# Patient Record
Sex: MALE | Race: BLACK OR AFRICAN AMERICAN | NOT HISPANIC OR LATINO | Employment: FULL TIME | URBAN - METROPOLITAN AREA
[De-identification: names, ages, dates, MRNs, and addresses within clinical notes are randomized per-mention and may not be internally consistent; named-entity substitution may affect disease eponyms.]

---

## 2017-03-28 ENCOUNTER — TELEPHONE (OUTPATIENT)
Dept: FAMILY MEDICINE | Facility: CLINIC | Age: 57
End: 2017-03-28

## 2017-03-28 DIAGNOSIS — Z12.11 COLON CANCER SCREENING: ICD-10-CM

## 2017-03-28 DIAGNOSIS — Z86.010 HISTORY OF COLON POLYPS: Primary | ICD-10-CM

## 2017-03-28 NOTE — TELEPHONE ENCOUNTER
Last colon 6/12/12-patient instructed to go to gi dept at [facility] to schedule and  prep. Need screening colonoscopy orders.

## 2017-03-28 NOTE — TELEPHONE ENCOUNTER
----- Message from Gama Cavazos sent at 3/28/2017  9:39 AM CDT -----  Contact: Patient - [phone]  Called regarding request for Colonoscopy Screening - He wanted to talk about his last colonoscopy and needed to schedule a new one - Please reach him at [phone] to discuss

## 2017-04-06 ENCOUNTER — TELEPHONE (OUTPATIENT)
Dept: GASTROENTEROLOGY | Facility: CLINIC | Age: 57
End: 2017-04-06

## 2017-04-06 ENCOUNTER — TELEPHONE (OUTPATIENT)
Dept: FAMILY MEDICINE | Facility: CLINIC | Age: 57
End: 2017-04-06

## 2017-04-06 DIAGNOSIS — Z80.0 FAMILY HISTORY OF COLON CANCER: ICD-10-CM

## 2017-04-06 DIAGNOSIS — G47.33 OSA (OBSTRUCTIVE SLEEP APNEA): Primary | ICD-10-CM

## 2017-04-06 DIAGNOSIS — Z86.010 PERSONAL HISTORY OF COLONIC POLYPS: Primary | ICD-10-CM

## 2017-04-06 NOTE — TELEPHONE ENCOUNTER
----- Message from Sofia Sosa sent at 4/6/2017  1:36 PM CDT -----  Contact: pt [phone]  Patient called and asked for a call back about having  a test for Sleep Apnea. Thank you.

## 2017-04-06 NOTE — TELEPHONE ENCOUNTER
Spoke to patient. States that he had a sleep study several years ago but was told he did not have enough of a problem to warrant treatment at that time.  He reports that his snoring has become much worse since that time and is requesting a new study. His wife wakes home up at night due to his snoring and has witnessed that he stops breathing. 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   Please advise.

## 2017-04-06 NOTE — TELEPHONE ENCOUNTER
----- Message from Mirna Woods sent at 4/6/2017  7:44 AM CDT -----  Contact: heather  Needs to schedule colonoscopy   Call back

## 2017-04-07 NOTE — TELEPHONE ENCOUNTER
Patient informed. Lab scheduled  For 4/8/17. States he has already received a call to schedule sleep study.

## 2017-04-08 ENCOUNTER — LAB VISIT (OUTPATIENT)
Dept: LAB | Facility: HOSPITAL | Age: 57
End: 2017-04-08
Attending: FAMILY MEDICINE
Payer: COMMERCIAL

## 2017-04-08 DIAGNOSIS — G47.33 OSA (OBSTRUCTIVE SLEEP APNEA): ICD-10-CM

## 2017-04-08 LAB — TSH SERPL DL<=0.005 MIU/L-ACNC: 0.81 UIU/ML

## 2017-04-08 PROCEDURE — 84443 ASSAY THYROID STIM HORMONE: CPT

## 2017-04-08 PROCEDURE — 36415 COLL VENOUS BLD VENIPUNCTURE: CPT | Mod: PO

## 2017-04-10 ENCOUNTER — TELEPHONE (OUTPATIENT)
Dept: FAMILY MEDICINE | Facility: CLINIC | Age: 57
End: 2017-04-10

## 2017-04-10 NOTE — TELEPHONE ENCOUNTER
Lab tsh results discussed with patient- message faxed to dept to call patient to make appt for sleep study

## 2017-04-10 NOTE — TELEPHONE ENCOUNTER
----- Message from Margo Gambino sent at 4/10/2017  7:43 AM CDT -----  Patient requesting to speak with nurse/stated has question (only information given)please call back at [phone] to advise.

## 2017-04-12 ENCOUNTER — TELEPHONE (OUTPATIENT)
Dept: FAMILY MEDICINE | Facility: CLINIC | Age: 57
End: 2017-04-12

## 2017-04-20 ENCOUNTER — TELEPHONE (OUTPATIENT)
Dept: FAMILY MEDICINE | Facility: CLINIC | Age: 57
End: 2017-04-20

## 2017-04-20 NOTE — TELEPHONE ENCOUNTER
----- Message from Melisa Joyner sent at 4/20/2017 10:16 AM CDT -----  Contact: Patient  Patient would like to know if you heard anything back about the Sleep Study. Please call patient at [phone].

## 2017-04-20 NOTE — TELEPHONE ENCOUNTER
Patient notified I spoke to Mary Jane at [employer] today- they were working on insurance verification and will call him today-patient given their phone number.

## 2017-05-05 RX ORDER — OMEPRAZOLE 40 MG/1
CAPSULE, DELAYED RELEASE ORAL
Qty: 30 CAPSULE | Refills: 5 | Status: SHIPPED | OUTPATIENT
Start: 2017-05-05 | End: 2017-10-20 | Stop reason: SDUPTHER

## 2017-05-09 ENCOUNTER — HOSPITAL ENCOUNTER (OUTPATIENT)
Facility: HOSPITAL | Age: 57
Discharge: HOME | End: 2017-05-09
Attending: SURGERY | Admitting: SURGERY
Payer: COMMERCIAL

## 2017-05-09 ENCOUNTER — SURGERY (OUTPATIENT)
Age: 57
End: 2017-05-09

## 2017-05-09 ENCOUNTER — ANESTHESIA (OUTPATIENT)
Dept: ENDOSCOPY | Facility: HOSPITAL | Age: 57
End: 2017-05-09
Payer: COMMERCIAL

## 2017-05-09 ENCOUNTER — ANESTHESIA EVENT (OUTPATIENT)
Dept: ENDOSCOPY | Facility: HOSPITAL | Age: 57
End: 2017-05-09
Payer: COMMERCIAL

## 2017-05-09 VITALS — RESPIRATION RATE: 11 BRPM

## 2017-05-09 DIAGNOSIS — Z86.010 HISTORY OF COLON POLYPS: ICD-10-CM

## 2017-05-09 PROCEDURE — D9220A PRA ANESTHESIA: Mod: CRNA,,, | Performed by: NURSE ANESTHETIST, CERTIFIED REGISTERED

## 2017-05-09 PROCEDURE — 63600175 PHARM REV CODE 636 W HCPCS: Performed by: NURSE ANESTHETIST, CERTIFIED REGISTERED

## 2017-05-09 PROCEDURE — 25000003 PHARM REV CODE 250: Performed by: NURSE ANESTHETIST, CERTIFIED REGISTERED

## 2017-05-09 PROCEDURE — G0105 COLORECTAL SCRN; HI RISK IND: HCPCS | Performed by: SURGERY

## 2017-05-09 PROCEDURE — G0105 COLORECTAL SCRN; HI RISK IND: HCPCS | Mod: ,,, | Performed by: SURGERY

## 2017-05-09 PROCEDURE — D9220A PRA ANESTHESIA: Mod: ANES,,, | Performed by: ANESTHESIOLOGY

## 2017-05-09 PROCEDURE — 37000008 HC ANESTHESIA 1ST 15 MINUTES: Performed by: SURGERY

## 2017-05-09 PROCEDURE — 25000003 PHARM REV CODE 250: Performed by: SURGERY

## 2017-05-09 PROCEDURE — 37000009 HC ANESTHESIA EA ADD 15 MINS: Performed by: SURGERY

## 2017-05-09 RX ORDER — ASPIRIN 81 MG/1
81 TABLET ORAL DAILY
COMMUNITY
End: 2018-04-23 | Stop reason: ALTCHOICE

## 2017-05-09 RX ORDER — LIDOCAINE HCL/PF 100 MG/5ML
SYRINGE (ML) INTRAVENOUS
Status: DISCONTINUED | OUTPATIENT
Start: 2017-05-09 | End: 2017-05-09

## 2017-05-09 RX ORDER — PROPOFOL 10 MG/ML
VIAL (ML) INTRAVENOUS
Status: DISCONTINUED | OUTPATIENT
Start: 2017-05-09 | End: 2017-05-09

## 2017-05-09 RX ORDER — SODIUM CHLORIDE 9 MG/ML
INJECTION, SOLUTION INTRAVENOUS CONTINUOUS
Status: DISCONTINUED | OUTPATIENT
Start: 2017-05-09 | End: 2017-05-09 | Stop reason: HOSPADM

## 2017-05-09 RX ORDER — PROPOFOL 10 MG/ML
INJECTION, EMULSION INTRAVENOUS
Status: DISCONTINUED
Start: 2017-05-09 | End: 2017-05-09 | Stop reason: HOSPADM

## 2017-05-09 RX ADMIN — SODIUM CHLORIDE 1000 ML: 0.9 INJECTION, SOLUTION INTRAVENOUS at 07:05

## 2017-05-09 RX ADMIN — LIDOCAINE HYDROCHLORIDE 50 MG: 20 INJECTION, SOLUTION INTRAVENOUS at 07:05

## 2017-05-09 RX ADMIN — PROPOFOL 100 MG: 10 INJECTION, EMULSION INTRAVENOUS at 07:05

## 2017-05-09 NOTE — ANESTHESIA POSTPROCEDURE EVALUATION
"Anesthesia Post Evaluation    Patient: Rodo Luna    Procedure(s) Performed: Procedure(s) (LRB):  COLONOSCOPY (N/A)    Final Anesthesia Type: general  Patient location during evaluation: PACU  Patient participation: Yes- Able to Participate  Level of consciousness: awake and alert  Post-procedure vital signs: reviewed and stable  Pain management: adequate  Airway patency: patent  PONV status at discharge: No PONV  Anesthetic complications: no      Cardiovascular status: hemodynamically stable  Respiratory status: unassisted and room air  Hydration status: euvolemic  Follow-up not needed.        Visit Vitals    BP (!) 169/91    Pulse 65    Temp 36.3 °C (97.3 °F)    Resp 12    Ht 6' 2" (1.88 m)    Wt 120.2 kg (265 lb)    SpO2 98%    BMI 34.02 kg/m2       Pain/Lavern Score: Pain Assessment Performed: Yes (5/9/2017  7:45 AM)  Presence of Pain: denies (5/9/2017  7:55 AM)  Pain Rating Prior to Med Admin: 8 (5/9/2017  7:08 AM)  Lavern Score: 10 (5/9/2017  8:15 AM)      "

## 2017-05-09 NOTE — TRANSFER OF CARE
"Anesthesia Transfer of Care Note    Patient: Rodo Luna    Procedure(s) Performed: Procedure(s) (LRB):  COLONOSCOPY (N/A)    Patient location: GI    Anesthesia Type: general    Transport from OR: Transported from OR on 2-3 L/min O2 by NC with adequate spontaneous ventilation    Post pain: adequate analgesia    Post assessment: no apparent anesthetic complications    Post vital signs: stable    Level of consciousness: awake, alert and oriented    Nausea/Vomiting: no nausea/vomiting    Complications: none    Transfer of care protocol was followed      Last vitals:   Visit Vitals    /78    Pulse 68    Temp 36.2 °C (97.1 °F) (Oral)    Resp 11    Ht 6' 2" (1.88 m)    Wt 120.2 kg (265 lb)    SpO2 100%    BMI 34.02 kg/m2     "

## 2017-05-09 NOTE — IP AVS SNAPSHOT
[address] [place] [address]  Phone: [phone]           Patient Discharge Instructions   Our goal is to set you up for success. This packet includes information on your condition, medications, and your home care.  It will help you care for yourself to prevent having to return to the hospital.     Please ask your nurse if you have any questions.      There are many details to remember when preparing to leave the hospital. Here is what you will need to do:    1. Take your medicine. If you are prescribed medications, review your Medication List on the following pages. You may have new medications to  at the pharmacy and others that you'll need to stop taking. Review the instructions for how and when to take your medications. Talk with your doctor or nurses if you are unsure of what to do.     2. Go to your follow-up appointments. Specific follow-up information is listed in the following pages. Your may be contacted by a nurse or clinical provider about future appointments. Be sure we have all of the phone numbers to reach you. Please contact your provider's office if you are unable to make an appointment.     3. Watch for warning signs. Your doctor or nurse will give you detailed warning signs to watch for and when to call for assistance. These instructions may also include educational information about your condition. If you experience any of warning signs to your health, call your doctor.           [facility]  Unless otherwise directed by your provider, please   contact [facility], our nurse care line   that is available for 24/7 assistance.     [phone] (toll-free)     Registered nurses in the [facility]   provide: appointment scheduling, clinical advisement, health education, and other advisory services.                  ** Verify the list of medication(s) below is accurate and up to date. Carry this with you in case of  emergency. If your medications have changed, please notify your healthcare provider.             Medication List      CONTINUE taking these medications        Additional Info                      aspirin 81 MG EC tablet   Commonly known as:  ECOTRIN   Refills:  0   Dose:  81 mg    Instructions:  Take 81 mg by mouth once daily.     Begin Date    AM    Noon    PM    Bedtime       atorvastatin 40 MG tablet   Commonly known as:  LIPITOR   Quantity:  30 tablet   Refills:  10    Instructions:  TAKE 1 TABLET (40 MG TOTAL) BY MOUTH ONCE DAILY.     Begin Date    AM    Noon    PM    Bedtime       fluticasone 50 mcg/actuation nasal spray   Commonly known as:  FLONASE   Quantity:  16 g   Refills:  11    Instructions:  SPRAY 2 SPRAYS BY EACH NARE ROUTE ONCE DAILY.     Begin Date    AM    Noon    PM    Bedtime       omeprazole 40 MG capsule   Commonly known as:  PRILOSEC   Quantity:  30 capsule   Refills:  5    Instructions:  TAKE 1 CAPSULE (40 MG TOTAL) BY MOUTH EVERY MORNING.     Begin Date    AM    Noon    PM    Bedtime       paroxetine 20 MG tablet   Commonly known as:  PAXIL   Quantity:  30 tablet   Refills:  5   Dose:  20 mg    Instructions:  Take 1 tablet (20 mg total) by mouth every morning.     Begin Date    AM    Noon    PM    Bedtime       predniSONE 10 MG tablet   Commonly known as:  DELTASONE   Quantity:  9 tablet   Refills:  0    Instructions:  Take 2 pills a day for 3 days then 1 pill a day for 3 days     Begin Date    AM    Noon    PM    Bedtime       valsartan-hydrochlorothiazide 160-25 mg per tablet   Commonly known as:  DIOVAN-HCT   Quantity:  90 tablet   Refills:  3   Dose:  1 tablet    Instructions:  Take 1 tablet by mouth once daily.     Begin Date    AM    Noon    PM    Bedtime                  Please bring to all follow up appointments:    1. A copy of your discharge instructions.  2. All medicines you are currently taking in their original bottles.  3. Identification and insurance card.    Please arrive  "15 minutes ahead of scheduled appointment time.    Please call 24 hours in advance if you must reschedule your appointment and/or time.        Follow-up Information     Follow up with Abad Rasmussen MD.    Specialty:  Family Medicine    Why:  As needed    Contact information:    [place] [address]  [phone]          Discharge Instructions     Future Orders    Activity as tolerated     Diet general     Questions:    Total calories:      Fat restriction, if any:      Protein restriction, if any:      Na restriction, if any:      Fluid restriction:      Additional restrictions:          Discharge Instructions       Discharge Instructions: After Your Surgery/Procedure  Youve just had surgery. During surgery you were given medicine called anesthesia to keep you relaxed and free of pain. After surgery you may have some pain or nausea. This is common. Here are some tips for feeling better and getting well after surgery.     Stay on schedule with your medication.   Going home  Your doctor or nurse will show you how to take care of yourself when you go home. He or she will also answer your questions. Have an adult family member or friend drive you home.      For your safety we recommend these precaution for the first 24 hours after your procedure:  · Do not drive or use heavy equipment.  · Do not make important decisions or sign legal papers.  · Do not drink alcohol.  · Have someone stay with you, if needed. He or she can watch for problems and help keep you safe.  · Your concentration, balance, coordination, and judgement may be impaired for many hours after anesthesia.  Use caution when ambulating or standing up.     · You may feel weak and "washed out" after anesthesia and surgery.      Subtle residual effects of general anesthesia or sedation with regional / local anesthesia can last more than 24 hours.  Rest for the remainder of the day or longer if your Doctor/Surgeon has advised you to do so.  " Although you may feel normal within the first 24 hours, your reflexes and mental ability may be impaired without you realizing it.  You may feel dizzy, lightheaded or sleepy for 24 hours or longer.      Be sure to go to all follow-up visits with your doctor. And rest after your surgery for as long as your doctor tells you to.  Coping with pain  If you have pain after surgery, pain medicine will help you feel better. Take it as told, before pain becomes severe. Also, ask your doctor or pharmacist about other ways to control pain. This might be with heat, ice, or relaxation. And follow any other instructions your surgeon or nurse gives you.  Tips for taking pain medicine  To get the best relief possible, remember these points:  · Pain medicines can upset your stomach. Taking them with a little food may help.  · Most pain relievers taken by mouth need at least 20 to 30 minutes to start to work.  · Taking medicine on a schedule can help you remember to take it. Try to time your medicine so that you can take it before starting an activity. This might be before you get dressed, go for a walk, or sit down for dinner.  · Constipation is a common side effect of pain medicines. Call your doctor before taking any medicines such as laxatives or stool softeners to help ease constipation. Also ask if you should skip any foods. Drinking lots of fluids and eating foods such as fruits and vegetables that are high in fiber can also help. Remember, do not take laxatives unless your surgeon has prescribed them.  · Drinking alcohol and taking pain medicine can cause dizziness and slow your breathing. It can even be deadly. Do not drink alcohol while taking pain medicine.  · Pain medicine can make you react more slowly to things. Do not drive or run machinery while taking pain medicine.  Your health care provider may tell you to take acetaminophen to help ease your pain. Ask him or her how much you are supposed to take each day.  Acetaminophen or other pain relievers may interact with your prescription medicines or other over-the-counter (OTC) drugs. Some prescription medicines have acetaminophen and other ingredients. Using both prescription and OTC acetaminophen for pain can cause you to overdose. Read the labels on your OTC medicines with care. This will help you to clearly know the list of ingredients, how much to take, and any warnings. It may also help you not take too much acetaminophen. If you have questions or do not understand the information, ask your pharmacist or health care provider to explain it to you before you take the OTC medicine.  Managing nausea  Some people have an upset stomach after surgery. This is often because of anesthesia, pain, or pain medicine, or the stress of surgery. These tips will help you handle nausea and eat healthy foods as you get better. If you were on a special food plan before surgery, ask your doctor if you should follow it while you get better. These tips may help:  · Do not push yourself to eat. Your body will tell you when to eat and how much.  · Start off with clear liquids and soup. They are easier to digest.  · Next try semi-solid foods, such as mashed potatoes, applesauce, and gelatin, as you feel ready.  · Slowly move to solid foods. Dont eat fatty, rich, or spicy foods at first.  · Do not force yourself to have 3 large meals a day. Instead eat smaller amounts more often.  · Take pain medicines with a small amount of solid food, such as crackers or toast, to avoid nausea.     Call your surgeon if  · You still have pain an hour after taking medicine. The medicine may not be strong enough.  · You feel too sleepy, dizzy, or groggy. The medicine may be too strong.  · You have side effects like nausea, vomiting, or skin changes, such as rash, itching, or hives.       If you have obstructive sleep apnea  You were given anesthesia medicine during surgery to keep you comfortable and free of  "pain. After surgery, you may have more apnea spells because of this medicine and other medicines you were given. The spells may last longer than usual.   At home:  · Keep using the continuous positive airway pressure (CPAP) device when you sleep. Unless your health care provider tells you not to, use it when you sleep, day or night. CPAP is a common device used to treat obstructive sleep apnea.  · Talk with your provider before taking any pain medicine, muscle relaxants, or sedatives. Your provider will tell you about the possible dangers of taking these medicines.  © 0119-4155 [employer]. [address] [place] [id number]. All rights reserved. This information is not intended as a substitute for professional medical care. Always follow your healthcare professional's instructions.        Primary Diagnosis     Your primary diagnosis was:  History Of Colonic Polyps      Admission Information     Date & Time Provider Department CSN    5/9/2017  6:02 AM Louis Lau MD [facility] [id number]      Care Providers     Provider Role Specialty Primary office phone    Louis Lau MD Attending Provider General Surgery [phone]    Louis Lau MD Surgeon  General Surgery [phone]      Your Vitals Were     BP Pulse Temp Resp Height Weight    130/78 68 97.1 °F (36.2 °C) (Oral) 11 6' 2" (1.88 m) 120.2 kg (265 lb)    SpO2 BMI             100% 34.02 kg/m2         Recent Lab Values     No lab values to display.      Allergies as of 5/9/2017        Reactions    Doxycycline     Other reaction(s): Rash  Other reaction(s): Rash    Phenytoin Sodium Extended     Other reaction(s): Bright light    Sulfa (Sulfonamide Antibiotics)       Advance Directives     An advance directive is a document which, in the event you are no longer able to make decisions for yourself, tells your healthcare team what kind of treatment you do or do not want to receive, or who you would like to make those " decisions for you.  If you do not currently have an advance directive, [facility] encourages you to create one.  For more information call:  [phone] ([phone]), [phone] ([phone]),  or log on to [email or web address].        Language Assistance Services     ATTENTION: Language assistance services are available, free of charge. Please call [phone].      ATENCIÓN: Si habla sarayañol, tiene a wall disposición servicios gratuitos de asistencia lingüística. Llame al [phone].     [facility] Ý: N?u b?n nói Ti?ng Vi?t, có các d?ch v? h? tr? ngôn ng? mi?n phí dành cho b?n. G?i s? [phone].         [facility] complies with applicable Federal civil rights laws and does not discriminate on the basis of race, color, national origin, age, disability, or sex.

## 2017-05-09 NOTE — H&P
History & Physical - Short Stay  Endoscopy      SUBJECTIVE:     Procedure: Colonoscopy    Chief Complaint/Indication for Procedure: Previous Polyps    History of Present Illness:  Patient is a 57 y.o. male presents for surveillence. Symptoms include heartburn Previous studies include colonoscopy and upper endoscopy.    PTA Medications   Medication Sig    aspirin (ECOTRIN) 81 MG EC tablet Take 81 mg by mouth once daily.    atorvastatin (LIPITOR) 40 MG tablet TAKE 1 TABLET (40 MG TOTAL) BY MOUTH ONCE DAILY.    fluticasone (FLONASE) 50 mcg/actuation nasal spray SPRAY 2 SPRAYS BY EACH NARE ROUTE ONCE DAILY.    omeprazole (PRILOSEC) 40 MG capsule TAKE 1 CAPSULE (40 MG TOTAL) BY MOUTH EVERY MORNING.    paroxetine (PAXIL) 20 MG tablet Take 1 tablet (20 mg total) by mouth every morning.    predniSONE (DELTASONE) 10 MG tablet Take 2 pills a day for 3 days then 1 pill a day for 3 days    valsartan-hydrochlorothiazide (DIOVAN-HCT) 160-25 mg per tablet Take 1 tablet by mouth once daily.       Review of patient's allergies indicates:   Allergen Reactions    Doxycycline      Other reaction(s): Rash  Other reaction(s): Rash    Phenytoin sodium extended      Other reaction(s): Bright light    Sulfa (sulfonamide antibiotics)         Past Medical History:   Diagnosis Date    Allergy     GERD (gastroesophageal reflux disease)     Hematuria     History of brachytherapy     Hyperlipidemia     Hypertension     Primary adenocarcinoma of prostate     nik 6, s/p brachytherapy    URI (upper respiratory infection) 06/03/2016    [facility]    Varicose veins of leg with swelling 5/13/2013    Wears glasses      Past Surgical History:   Procedure Laterality Date    COLONOSCOPY  6/12/12    Dr. Lau 5 year recheck    CYSTOSCOPY      LIPOMA RESECTION      RIGHT THIGH    TONSILLECTOMY       Family History   Problem Relation Age of Onset    Arthritis Mother     Hyperlipidemia Mother     Arthritis Father      Hyperlipidemia Father     Arthritis Sister     Hyperlipidemia Sister     Sinusitis Daughter     Hyperlipidemia Daughter     Arthritis Maternal Grandmother     Stroke Maternal Grandmother     Prostate cancer Neg Hx     Urolithiasis Neg Hx     Kidney cancer Neg Hx      Social History   Substance Use Topics    Smoking status: Never Smoker    Smokeless tobacco: Never Used    Alcohol use 6.0 oz/week     12 Standard drinks or equivalent per week      Comment: SOCIAL       Review of Systems:  Normal BM's, denies hematochezia, melena or pain., Denies nausea, vomiting or diarrhea.    OBJECTIVE:     Vital Signs (Most Recent)  Temp: 97.1 °F (36.2 °C) (05/09/17 0706)  Pulse: 66 (05/09/17 0706)  Resp: (!) 21 (05/09/17 0706)  BP: (!) 164/89 (05/09/17 0706)  SpO2: 98 % (05/09/17 0706)    Physical Exam:  HEENT - WNL  Lungs - Clear  Heart- RR  Abdomen - soft, non tender, no masses      Diagnostic Results:  Labs: Reviewed    ASSESSMENT/PLAN:       Plan: Colonoscopy    Anesthesia Plan: General    ASA Grade: ASA 2 - Patient with mild systemic disease with no functional limitations

## 2017-05-09 NOTE — DISCHARGE INSTRUCTIONS

## 2017-05-09 NOTE — PLAN OF CARE
Pt. Awake, alert and oriented.  No complaints of pain.  Passing air.  Vitals within defined limits.  Ready for discharge.  Meets discharge criteria.

## 2017-05-09 NOTE — ANESTHESIA PREPROCEDURE EVALUATION
05/09/2017  Rodo Luna is a 57 y.o., male.    Anesthesia Evaluation    I have reviewed the Patient Summary Reports.    I have reviewed the Nursing Notes.      Review of Systems  Anesthesia Hx:  No problems with previous Anesthesia    Social:  Non-Smoker    Hematology/Oncology:         -- Cancer in past history: Other (see Oncology comments)   EENT/Dental:EENT/Dental Normal   Cardiovascular:   Hypertension, well controlled    Pulmonary:  Pulmonary Normal    Renal/:  Renal/ Normal     Hepatic/GI:   GERD    Musculoskeletal:  Musculoskeletal Normal    Neurological:  Neurology Normal    Endocrine:  Endocrine Normal    Dermatological:  Skin Normal    Psych:  Psychiatric Normal           Physical Exam  General:  Obesity    Airway/Jaw/Neck:  AIRWAY FINDINGS: Normal      Eyes/Ears/Nose:  EYES/EARS/NOSE FINDINGS: Normal   Dental:  DENTAL FINDINGS: Normal   Chest/Lungs:  Chest/Lungs Findings: Clear to auscultation, Normal Respiratory Rate     Heart/Vascular:  Heart Findings: Rate: Normal  Rhythm: Regular Rhythm  Sounds: Normal  Heart murmur: negative Vascular Findings: Normal    Abdomen:  Abdomen Findings: Normal    Musculoskeletal:  Musculoskeletal Findings: Normal   Skin:  Skin Findings: Normal    Mental Status:  Mental Status Findings: Normal        Anesthesia Plan  Type of Anesthesia, risks & benefits discussed:  Anesthesia Type:  general  Patient's Preference:   Intra-op Monitoring Plan:   Intra-op Monitoring Plan Comments:   Post Op Pain Control Plan:   Post Op Pain Control Plan Comments:   Induction:   IV  Beta Blocker:  Patient is not currently on a Beta-Blocker (No further documentation required).       Informed Consent: Patient understands risks and agrees with Anesthesia plan.  Questions answered. Anesthesia consent signed with patient.  ASA Score: 2     Day of Surgery Review of History & Physical:     H&P update referred to the surgeon.         Ready For Surgery From Anesthesia Perspective.

## 2017-05-09 NOTE — OR NURSING
Sedation, oxygen delivery, and monitoring per anesthesia    Have you had a colonoscopy LESS THAN 3 years ago?   * If YES, answer these questions*:  No    1. Did patient have a prior colonic polyp in a previous surveillance/diagnostic colonoscopy and is 18 years or older on date of encounter?    2. Documentation of < 3 year interval since the patients last colonoscopy due to medical reasons (eg., last colonoscopy incomplete, last colonoscopy had inadequate prep, piecemeal removal of adenomas, or last colonoscopy found > 10 adenomas) ?

## 2017-05-10 VITALS
WEIGHT: 265 LBS | SYSTOLIC BLOOD PRESSURE: 169 MMHG | HEART RATE: 65 BPM | BODY MASS INDEX: 34.01 KG/M2 | RESPIRATION RATE: 12 BRPM | TEMPERATURE: 97 F | DIASTOLIC BLOOD PRESSURE: 91 MMHG | HEIGHT: 74 IN | OXYGEN SATURATION: 98 %

## 2017-08-04 ENCOUNTER — TELEPHONE (OUTPATIENT)
Dept: SURGERY | Facility: CLINIC | Age: 57
End: 2017-08-04

## 2017-08-04 NOTE — TELEPHONE ENCOUNTER
Patient's last colonoscopy was in June of 2012 with a recommended repeat in 5 years .  Patient had his 5 year repeat in May and he received his recall letter yesterday.  Advised patient that he had his colonoscopy before the recall letter went out so he can disregard that.

## 2017-08-04 NOTE — TELEPHONE ENCOUNTER
----- Message from Festus Stevens sent at 8/4/2017  8:43 AM CDT -----  Contact: Pt   Pt is requesting to speak with the nurse in regards to a letter he received concerning a colonoscopy. Pt can be reached at [phone]

## 2017-09-29 DIAGNOSIS — I10 ESSENTIAL HYPERTENSION: ICD-10-CM

## 2017-09-29 RX ORDER — VALSARTAN AND HYDROCHLOROTHIAZIDE 160; 25 MG/1; MG/1
TABLET ORAL
Qty: 90 TABLET | Refills: 0 | Status: SHIPPED | OUTPATIENT
Start: 2017-09-29 | End: 2017-10-20 | Stop reason: SDUPTHER

## 2017-09-29 NOTE — TELEPHONE ENCOUNTER
Prescription filled for 90 day supply with no refills.  Patient's last OV was 12/13/16 and BP was elevated.  Last lab lipid panel 10/17/16, CMP 5/11/15.  He needs an appointment for follow up and will need to get blood work done that will be ordered at visit.  Thanks.  Chasity

## 2017-10-12 ENCOUNTER — TELEPHONE (OUTPATIENT)
Dept: FAMILY MEDICINE | Facility: CLINIC | Age: 57
End: 2017-10-12

## 2017-10-12 NOTE — TELEPHONE ENCOUNTER
----- Message from Hailee Crooks sent at 10/11/2017  4:20 PM CDT -----  Contact: Rodo   Please call back regarding labs. Patient states he is not having physical. The visit was per Dr. Rasmussen for his prescriptions. Call him [phone] (work)  Thanks!

## 2017-10-12 NOTE — TELEPHONE ENCOUNTER
Tried numerous times to call work number constant busy signal- left voice mail on cell that lab will be ordered when seen.

## 2017-10-12 NOTE — TELEPHONE ENCOUNTER
----- Message from Margo Partida sent at 10/10/2017  2:59 PM CDT -----  Patient is requesting labs prior to 10/18/17 appointment please contact patient at [phone] when order a placed and ready to schedule.       Thank you

## 2017-10-18 ENCOUNTER — DOCUMENTATION ONLY (OUTPATIENT)
Dept: FAMILY MEDICINE | Facility: CLINIC | Age: 57
End: 2017-10-18

## 2017-10-18 NOTE — PROGRESS NOTES
Pre-Visit Chart Review  For Appointment Scheduled on 10-19-17    Health Maintenance Due   Topic Date Due    Pneumococcal PCV13 (High Risk) (1 - PCV13 Required) 04/15/1961    Pneumococcal PPSV23 (High Risk) (1) 04/15/1979    Influenza Vaccine  08/01/2017    Lipid Panel  10/17/2017

## 2017-10-19 ENCOUNTER — DOCUMENTATION ONLY (OUTPATIENT)
Dept: FAMILY MEDICINE | Facility: CLINIC | Age: 57
End: 2017-10-19

## 2017-10-19 NOTE — PROGRESS NOTES
Pre-Visit Chart Review  For Appointment Scheduled on 10/20/2017    Health Maintenance Due   Topic Date Due    Pneumococcal PCV13 (High Risk) (1 - PCV13 Required) 04[birth year]15/1962    Pneumococcal PPSV23 (High Risk) (1) 04/15/1979    Influenza Vaccine  08/01/2017    Lipid Panel  10/17/2017

## 2017-10-20 ENCOUNTER — OFFICE VISIT (OUTPATIENT)
Dept: FAMILY MEDICINE | Facility: CLINIC | Age: 57
End: 2017-10-20
Payer: COMMERCIAL

## 2017-10-20 ENCOUNTER — LAB VISIT (OUTPATIENT)
Dept: LAB | Facility: HOSPITAL | Age: 57
End: 2017-10-20
Attending: NURSE PRACTITIONER
Payer: COMMERCIAL

## 2017-10-20 VITALS
DIASTOLIC BLOOD PRESSURE: 86 MMHG | BODY MASS INDEX: 34.18 KG/M2 | SYSTOLIC BLOOD PRESSURE: 128 MMHG | WEIGHT: 266.31 LBS | HEART RATE: 64 BPM | HEIGHT: 74 IN | TEMPERATURE: 98 F

## 2017-10-20 DIAGNOSIS — E78.5 HYPERLIPIDEMIA, UNSPECIFIED HYPERLIPIDEMIA TYPE: ICD-10-CM

## 2017-10-20 DIAGNOSIS — C61 PROSTATE CANCER: ICD-10-CM

## 2017-10-20 DIAGNOSIS — I10 ESSENTIAL HYPERTENSION: ICD-10-CM

## 2017-10-20 DIAGNOSIS — Z79.899 HIGH RISK MEDICATION USE: ICD-10-CM

## 2017-10-20 DIAGNOSIS — E66.09 CLASS 1 OBESITY DUE TO EXCESS CALORIES WITH SERIOUS COMORBIDITY AND BODY MASS INDEX (BMI) OF 34.0 TO 34.9 IN ADULT: ICD-10-CM

## 2017-10-20 DIAGNOSIS — K21.9 GASTROESOPHAGEAL REFLUX DISEASE WITHOUT ESOPHAGITIS: ICD-10-CM

## 2017-10-20 DIAGNOSIS — Z00.00 ANNUAL PHYSICAL EXAM: Primary | ICD-10-CM

## 2017-10-20 DIAGNOSIS — Z23 NEED FOR IMMUNIZATION AGAINST INFLUENZA: ICD-10-CM

## 2017-10-20 DIAGNOSIS — J30.9 ALLERGIC SINUSITIS: ICD-10-CM

## 2017-10-20 LAB
ALBUMIN SERPL BCP-MCNC: 4 G/DL
ALP SERPL-CCNC: 73 U/L
ALT SERPL W/O P-5'-P-CCNC: 25 U/L
ANION GAP SERPL CALC-SCNC: 9 MMOL/L
AST SERPL-CCNC: 21 U/L
BASOPHILS # BLD AUTO: 0.09 K/UL
BASOPHILS NFR BLD: 1.1 %
BILIRUB SERPL-MCNC: 0.8 MG/DL
BUN SERPL-MCNC: 15 MG/DL
CALCIUM SERPL-MCNC: 9.7 MG/DL
CHLORIDE SERPL-SCNC: 103 MMOL/L
CHOLEST SERPL-MCNC: 178 MG/DL
CHOLEST/HDLC SERPL: 2.7 {RATIO}
CO2 SERPL-SCNC: 28 MMOL/L
CREAT SERPL-MCNC: 1.2 MG/DL
DIFFERENTIAL METHOD: NORMAL
EOSINOPHIL # BLD AUTO: 0.1 K/UL
EOSINOPHIL NFR BLD: 1.6 %
ERYTHROCYTE [DISTWIDTH] IN BLOOD BY AUTOMATED COUNT: 13.4 %
EST. GFR  (AFRICAN AMERICAN): >60 ML/MIN/1.73 M^2
EST. GFR  (NON AFRICAN AMERICAN): >60 ML/MIN/1.73 M^2
GLUCOSE SERPL-MCNC: 93 MG/DL
HCT VFR BLD AUTO: 44.5 %
HDLC SERPL-MCNC: 67 MG/DL
HDLC SERPL: 37.6 %
HGB BLD-MCNC: 14.8 G/DL
IMM GRANULOCYTES # BLD AUTO: 0.02 K/UL
IMM GRANULOCYTES NFR BLD AUTO: 0.2 %
LDLC SERPL CALC-MCNC: 93.8 MG/DL
LYMPHOCYTES # BLD AUTO: 2 K/UL
LYMPHOCYTES NFR BLD: 25 %
MCH RBC QN AUTO: 29.5 PG
MCHC RBC AUTO-ENTMCNC: 33.3 G/DL
MCV RBC AUTO: 89 FL
MONOCYTES # BLD AUTO: 0.9 K/UL
MONOCYTES NFR BLD: 11.6 %
NEUTROPHILS # BLD AUTO: 4.9 K/UL
NEUTROPHILS NFR BLD: 60.5 %
NONHDLC SERPL-MCNC: 111 MG/DL
NRBC BLD-RTO: 0 /100 WBC
PLATELET # BLD AUTO: 232 K/UL
PMV BLD AUTO: 10.7 FL
POTASSIUM SERPL-SCNC: 4.1 MMOL/L
PROSTATE SPECIFIC ANTIGEN, TOTAL: 0.06 NG/ML
PROT SERPL-MCNC: 7.8 G/DL
PSA FREE MFR SERPL: NORMAL %
PSA FREE SERPL-MCNC: <0.01 NG/ML
RBC # BLD AUTO: 5.02 M/UL
SODIUM SERPL-SCNC: 140 MMOL/L
TRIGL SERPL-MCNC: 86 MG/DL
WBC # BLD AUTO: 8.05 K/UL

## 2017-10-20 PROCEDURE — 80061 LIPID PANEL: CPT

## 2017-10-20 PROCEDURE — 36415 COLL VENOUS BLD VENIPUNCTURE: CPT | Mod: PO

## 2017-10-20 PROCEDURE — 85025 COMPLETE CBC W/AUTO DIFF WBC: CPT

## 2017-10-20 PROCEDURE — 90471 IMMUNIZATION ADMIN: CPT | Mod: S$GLB,,, | Performed by: NURSE PRACTITIONER

## 2017-10-20 PROCEDURE — 84153 ASSAY OF PSA TOTAL: CPT

## 2017-10-20 PROCEDURE — 99999 PR PBB SHADOW E&M-EST. PATIENT-LVL III: CPT | Mod: PBBFAC,,, | Performed by: NURSE PRACTITIONER

## 2017-10-20 PROCEDURE — 80053 COMPREHEN METABOLIC PANEL: CPT

## 2017-10-20 PROCEDURE — 90686 IIV4 VACC NO PRSV 0.5 ML IM: CPT | Mod: S$GLB,,, | Performed by: NURSE PRACTITIONER

## 2017-10-20 PROCEDURE — 99396 PREV VISIT EST AGE 40-64: CPT | Mod: 25,S$GLB,, | Performed by: NURSE PRACTITIONER

## 2017-10-20 RX ORDER — ATORVASTATIN CALCIUM 40 MG/1
40 TABLET, FILM COATED ORAL DAILY
Qty: 90 TABLET | Refills: 3 | Status: SHIPPED | OUTPATIENT
Start: 2017-10-20 | End: 2018-10-31 | Stop reason: SDUPTHER

## 2017-10-20 RX ORDER — FLUTICASONE PROPIONATE 50 MCG
2 SPRAY, SUSPENSION (ML) NASAL DAILY
Qty: 54 G | Refills: 3 | Status: SHIPPED | OUTPATIENT
Start: 2017-10-20 | End: 2018-04-23 | Stop reason: ALTCHOICE

## 2017-10-20 RX ORDER — OMEPRAZOLE 40 MG/1
40 CAPSULE, DELAYED RELEASE ORAL EVERY MORNING
Qty: 90 CAPSULE | Refills: 3 | Status: SHIPPED | OUTPATIENT
Start: 2017-10-20 | End: 2018-10-31 | Stop reason: SDUPTHER

## 2017-10-20 RX ORDER — VALSARTAN AND HYDROCHLOROTHIAZIDE 160; 25 MG/1; MG/1
1 TABLET ORAL DAILY
Qty: 90 TABLET | Refills: 3 | Status: SHIPPED | OUTPATIENT
Start: 2017-10-20 | End: 2018-07-30 | Stop reason: RX

## 2017-10-20 NOTE — PATIENT INSTRUCTIONS
Weight Management: Getting Started  Healthy bodies come in all shapes and sizes. Not all bodies are made to be thin. For some people, a healthy weight is higher than the average weight listed on weight charts. Your healthcare provider can help you decide on a healthy weight for you.    Reasons to lose weight  Losing weight can help with some health problems, such as high blood pressure, heart disease, diabetes, sleep apnea, and arthritis. You may also feel more energy.  Set your long-term goal  Your goal doesn't even have to be a specific weight. You may decide on a fitness goal (such as being able to walk 10 miles a week), or a health goal (such as lowering your blood pressure). Choose a goal that is measurable and reasonable, so you know when you've reached it. A goal of reaching a BMI of less than 25 is not always reasonable (or possible).   Make an action plan  Habits dont change overnight. Setting your goals too high can leave you feeling discouraged if you cant reach them. Be realistic. Choose one or two small changes you can make now. Set an action plan for how you are going to make these changes. When you can stick to this plan, keep making a few more small changes. Taking small steps will help you stay on the path to success.  Track your progress  Write down your goals. Then, keep a daily record of your progress. Write down what you eat and how active you are. This record lets you look back on how much youve done. It may also help when youre feeling frustrated. Reward yourself for success. Even if you dont reach every goal, give yourself credit for what you do get done.  Get support  Encouragement from others can help make losing weight easier. Ask your family members and friends for support. They may even want to join you. Also look to your healthcare provider, registered dietitian, and  for help. Your local hospital can give you more information about nutrition, exercise, and  weight loss.  Date Last Reviewed: 1/31/2016 © 2000-2017 [employer]. [address] [place] [address], [place] [address]. All rights reserved. This information is not intended as a substitute for professional medical care. Always follow your healthcare professional's instructions.        Walking for Fitness  Fitness walking has something for everyone, even people who are already fit. Walking is one of the safest ways to condition your body aerobically. It can boost energy, help you lose weight, and reduce stress.    Physical benefits  · Walking strengthens your heart and lungs, and tones your muscles.  · When walking, your feet land with less impact than in other sports. This reduces chances of muscle, bone, and joint injury.  · Regular walking improves your cholesterol levels and lowers your risk of heart disease. And it helps you control your blood sugar if you have diabetes.  · Walking is a weight-bearing activity, which helps maintain bone density. This can help prevent osteoporosis.  Personal rewards  · Taking walks can help you relax and manage stress. And fitness walking may make you feel better about yourself.  · Walking can help you sleep better at night and make you less likely to be depressed.  · Regular walking may help maintain your memory as you get older.  · Walking is a great way to spend extra time with friends and family members. Be sure to invite your dog along!  Q&A about fitness walking  Q: Will walking keep me fit?  A: Yes. Regular walking at the right pace gives you all the benefits of other aerobic activities, such as jogging and swimming.  Q: Will walking help me lose weight and keep it off?  A: Yes. Per mile, walking can burn as many calories as jogging. Your health care provider can help work walking into your weight-loss plan.  Q: Is walking safe for my health?  A: Yes. Walking is safe if you have high blood pressure, diabetes, heart disease, or other conditions. Talk to your  healthcare provider before you start.  Date Last Reviewed: 4/1/2017 © 2000-2017 [employer]. [address] [place] [address], [place] [address]. All rights reserved. This information is not intended as a substitute for professional medical care. Always follow your healthcare professional's instructions.        Weight Management: Healthy Eating  Food is your bodys fuel. You cant live without it. The key is to give your body enough nutrients and energy without eating too much. Reading food labels can help you make healthy choices. Also, learn new eating habits to manage your weight.     All the values on the label are based on one serving. The serving size is the average portion. Remember to multiply the values on the label by the number of servings you eat.   Eat less fat  A gram of fat has almost 2.5 times the calories of a gram of protein or carbohydrates. Try to balance your food choices so that only 20% to 35% of your calories comes from total fat. This means an average of 2½ to 3½ grams of fat for each 100 calories you eat.  Eat more fiber  High-fiber foods are digested more slowly than low-fiber foods, so you feel full longer. Try to get at least 25 grams of fiber each day for a 2000 calorie diet. Foods high in fiber include:  · Vegetables and fruits  · Whole-grain or bran breads, pastas, and cereals  · Legumes (beans) and peas  As you begin to eat more fiber, be sure to drink plenty of water to keep your digestive system working smoothly.  Tips  Do's and don'ts include:   · Dont skip meals. This often leads to overeating later on. Its best to spread your eating throughout the day.  · Eat a variety of foods, not just a few favorites.  · If you find yourself eating when youre not hungry, ask yourself why. Many of us eat when were bored, stressed, or just to be polite. Listen to your body. If youre not hungry, get busy doing something else instead of eating.  · Eat slower, shooting for 20 to 30  minutes for each meal. It takes 20 minutes for your stomach to tell your brain that its full. Slow eaters tend to eat less and are still satisfied, while fast eaters may tend to be overeaters.   · Pay attention to what you eat. Dont read or watch TV during your meal.  Date Last Reviewed: 1/31/2016  © 5686-2395 [employer]. [address] [place] [address], [place] [address]. All rights reserved. This information is not intended as a substitute for professional medical care. Always follow your healthcare professional's instructions.        Weight Management: Overcoming Your Barriers    You may have many reasons why youre not ready to lose weight. You may not feel you have the time or the skills. You may be afraid of losing weight and gaining it back again. Well, you can lose weight. And you can keep the weight off, if you make changes slowly and stick with them. Remember that you may never find the perfect time to lose weight. Decide that the right time to be healthier is now.  Common barriers  Barrier 1: I dont want to deny myself.  Barrier Buster: You dont have to! Moderation is the key:  · Watch portion sizes and know when you're eating more than one serving.  · Plan to ask for a doggy bag when you eat out.  · Have just one.  · Choose lower-fat and lower-calorie versions of your favorites.  · Use a small plate instead of a normal-sized plate.   Barrier 2: I lost weight before but I gained it right back.  Barrier Buster: Make this time different:  · List what worked and didnt work last time and what you can try this time.  · Choose changes that you are willing to stick with.  · Work exercise into your weight-loss plan.  · Be realistic about what is possible. Your plan has to fit into your life in a balanced way that works for you.   Barrier 3: I dont have the time to be active.  Barrier Buster: It takes just a few minutes a day!  · Be active with a pet or the kids.  · Block off activity time in your  schedule.  · Borrow some time that you usually spend watching TV.  · You are too important not to take time to exercise--it is your life!   Feel good about yourself  Do you eat more because you feel bad about yourself, then feel even worse as you gain weight? This is a vicious cycle. Breaking this cycle is not easy. You may need group support or counseling. Always remember that you are a valuable person, no matter what size or shape you are.  Do you have a health problem? If so, dont use it as an excuse for not losing weight. Ask your healthcare provider or dietitian about methods to lose weight that are safe for you. For example, even if you have severe arthritis, it may be easier for you to exercise in a pool. Get advice from a .    Date Last Reviewed: 2/2/2016 © 2000-2017 [employer]. [address] [place] [address], [place] [address]. All rights reserved. This information is not intended as a substitute for professional medical care. Always follow your healthcare professional's instructions.        Weight Management: Take It Off and Keep It Off    Its easy to be motivated when you first start. The key is to stay motivated all along the way and to have realistic and achievable goals. There are things you can do to keep yourself on the path to success.  Dont focus on daily weight gains and losses. Instead, weigh yourself no more than once a week at the same time of day. Weighing yourself each day will probably only frustrate you.    Stay motivated  Here are suggestions to keep you motivated:   · Remind yourself of your goals. Post them near the refrigerator or desk where you work.  · Make daily entries in your diary or journal about your activity and eating. A visual reminder of success, like a gold star, can help keep you going.  · Every week, take time to look back on how much youve accomplished, and the changes you may have made.  · Try taking a nutrition class. It can help you  learn new shopping, cooking, and eating skills, and also meet new people. You might try a low-fat cooking or yoga class.  · Dont be hard on yourself or give up if you slip. Be patient. Learn from your mistakes and adjust your plan if you need to. Then get right back to it.  · Be realistic about your goals. Talk with a dietitian or your healthcare provider about what goals are reasonable for you.   Believe that you can do it  How you think about yourself is just as important as what you do. If you dont think you can succeed, chances are you wont. Believe that you can stick to your plan and meet your goals:  · If you dont meet a goal, dont use it as an excuse to give up on your whole plan. Adjust your goal and try again.  · Try to understand your own attitude about food.  Are you subject to emotional eating?  · Learn how to accept compliments. Even if you get embarrassed, just say thank you.  · Make a list of the things that others like about you and that you like about yourself. Add something new from time to time. Keep this list to look at when you need a lift.  Resources  · The Presidents [place] on Fitness, Sports & Nutritionwww.fitness.gov  · Academy of Nutrition and Dieteticswww.eatright.org  · Healthfinderwww.healthfinder.gov  Date Last Reviewed: 2/4/2016  © 6060-2558 [employer]. [address] [place] [address], [place] [address]. All rights reserved. This information is not intended as a substitute for professional medical care. Always follow your healthcare professional's instructions.        Controlling High Blood Pressure  High blood pressure (hypertension) is often called the silent killer. This is because many people who have it dont know it. High blood pressure is defined as 140/90 mm Hg or higher. Know your blood pressure and remember to check it regularly. Doing so can save your life. Here are some things you can do to help control your blood pressure.    Choose heart-healthy  foods  · Select low-salt, low-fat foods. Limit sodium intake to 2,400 mg per day or the amount suggested by your healthcare provider.  · Limit canned, dried, cured, packaged, and fast foods. These can contain a lot of salt.  · Eat 8 to 10 servings of fruits and vegetables every day.  · Choose lean meats, fish, or chicken.  · Eat whole-grain pasta, brown rice, and beans.  · Eat 2 to 3 servings of low-fat or fat-free dairy products.  · Ask your doctor about the DASH eating plan. This plan helps reduce blood pressure.  · When you go to a restaurant, ask that your meal be prepared with no added salt.  Maintain a healthy weight  · Ask your healthcare provider how many calories to eat a day. Then stick to that number.  · Ask your healthcare provider what weight range is healthiest for you. If you are overweight, a weight loss of only 3% to 5% of your body weight can help lower blood pressure. Generally, a good weight loss goal is to lose 10% of your body weight in a year.  · Limit snacks and sweets.  · Get regular exercise.  Get up and get active  · Choose activities you enjoy. Find ones you can do with friends or family. This includes bicycling, dancing, walking, and jogging.  · Park farther away from building entrances.  · Use stairs instead of the elevator.  · When you can, walk or bike instead of driving.  · [place] leaves, garden, or do household repairs.  · Be active at a moderate to vigorous level of physical activity for at least 40 minutes for a minimum of 3 to 4 days a week.   Manage stress  · Make time to relax and enjoy life. Find time to laugh.  · Communicate your concerns with your loved ones and your healthcare provider.  · Visit with family and friends, and keep up with hobbies.  Limit alcohol and quit smoking  · Men should have no more than 2 drinks per day.  · Women should have no more than 1 drink per day.  · Talk with your healthcare provider about quitting smoking. Smoking significantly increases your  risk for heart disease and stroke. Ask your healthcare provider about community smoking cessation programs and other options.  Medicines  If lifestyle changes arent enough, your healthcare provider may prescribe high blood pressure medicine. Take all medicines as prescribed. If you have any questions about your medicines, ask your healthcare provider before stopping or changing them.   Date Last Reviewed: 4/27/2016  © 5521-0915 [employer]. [address] [place] [address], [place] [address]. All rights reserved. This information is not intended as a substitute for professional medical care. Always follow your healthcare professional's instructions.

## 2017-10-20 NOTE — PROGRESS NOTES
Pt. Identified using  and name. VIS given to pt. Procedure was explained, pt. Verbalized understanding.FLU Quadrivalent administered IM in the left deltoid using sterile technique, no bleeding noted at injection site.  Pt. tolerated procedure well.

## 2017-10-20 NOTE — PROGRESS NOTES
Subjective:       Patient ID: Rodo Luna is a 57 y.o. male.    Chief Complaint: Hypertension    HPI   Chief Complaint  Chief Complaint   Patient presents with    Hypertension       HPI  Rodo Luna is a 57 y.o. male with medical diagnoses as listed in the medical history and problem list, established patient new to me, that presents for check up.  Regularly treated for hypertension, blood pressure is elevated today 148/90, but on recheck 128/86 hyperlipidemia, GERD, and sinus allergies. Patient has a history of prostate cancer, last PSA one year ago, follows up with Hai Gunn yearly, due to see him in November.   BMI today is 34.19 with a 1 pound weight gain since last visit.        PAST MEDICAL HISTORY:  Past Medical History:   Diagnosis Date    Allergy     GERD (gastroesophageal reflux disease)     Hematuria     History of brachytherapy     Hyperlipidemia     Hypertension     Primary adenocarcinoma of prostate     nik 6, s/p brachytherapy    [facility]    [facility]    Varicose veins of leg with swelling 5/13/2013    Wears glasses        PAST SURGICAL HISTORY:  Past Surgical History:   Procedure Laterality Date    COLONOSCOPY  6/12/12    Dr. Lau 5 year recheck    COLONOSCOPY N/A 5/9/2017    Procedure: COLONOSCOPY;  Surgeon: Louis Lua MD;  Location: [facility];  Service: Endoscopy;  Laterality: N/A;  personal history of colon polyps  family history of colon cancer    CYSTOSCOPY      LIPOMA RESECTION      RIGHT THIGH    TONSILLECTOMY         SOCIAL HISTORY:  Social History     Social History    Marital status:      Spouse name: N/A    Number of children: N/A    Years of education: N/A     Occupational History    Not on file.     Social History Main Topics    Smoking status: Never Smoker    Smokeless tobacco: Never Used    Alcohol use 6.0 oz/week     12 Standard drinks or equivalent per week      Comment: SOCIAL    Drug use: No     Sexual activity: Not on file     Other Topics Concern    Not on file     Social History Narrative    No narrative on file       FAMILY HISTORY:  Family History   Problem Relation Age of Onset    Arthritis Mother     Hyperlipidemia Mother     Arthritis Father     Hyperlipidemia Father     Arthritis Sister     Hyperlipidemia Sister     Sinusitis Daughter     Hyperlipidemia Daughter     Arthritis Maternal Grandmother     Stroke Maternal Grandmother     Prostate cancer Neg Hx     Urolithiasis Neg Hx     Kidney cancer Neg Hx        ALLERGIES AND MEDICATIONS: updated and reviewed.  Review of patient's allergies indicates:   Allergen Reactions    Doxycycline      Other reaction(s): Rash  Other reaction(s): Rash    Phenytoin sodium extended      Other reaction(s): Bright light    Sulfa (sulfonamide antibiotics)      Current Outpatient Prescriptions   Medication Sig Dispense Refill    aspirin (ECOTRIN) 81 MG EC tablet Take 81 mg by mouth once daily.      atorvastatin (LIPITOR) 40 MG tablet Take 1 tablet (40 mg total) by mouth once daily. 90 tablet 3    fluticasone (FLONASE) 50 mcg/actuation nasal spray 2 sprays by Each Nare route once daily. 54 g 3    omeprazole (PRILOSEC) 40 MG capsule Take 1 capsule (40 mg total) by mouth every morning. 90 capsule 3    valsartan-hydrochlorothiazide (DIOVAN-HCT) 160-25 mg per tablet Take 1 tablet by mouth once daily. 90 tablet 3     No current facility-administered medications for this visit.        Review of Systems   Constitutional: Negative for activity change, appetite change, chills, fatigue, fever and unexpected weight change.   HENT: Positive for sinus pain and sinus pressure. Negative for congestion, ear pain, hearing loss, postnasal drip, rhinorrhea, sore throat and trouble swallowing.         C/O sinus pressure over top of nose   Eyes: Negative for visual disturbance.        Wears glasses for reading   Respiratory: Negative for cough, shortness of breath and  "wheezing.    Cardiovascular: Negative for chest pain, palpitations and leg swelling.   Gastrointestinal: Negative for abdominal pain, constipation, diarrhea, nausea and vomiting.   Genitourinary: Negative for difficulty urinating, dysuria and urgency.        Hx of prostate cancer and sees Dr. Valles regularly   Musculoskeletal: Negative for arthralgias and myalgias.   Skin: Negative for rash and wound.   Neurological: Negative for dizziness, weakness, numbness and headaches.   Hematological: Negative for adenopathy.   Psychiatric/Behavioral: Positive for sleep disturbance. Negative for dysphoric mood and suicidal ideas. The patient is not nervous/anxious.         Needs to have sleep study but has not due to schedule       Objective:       Vitals:    10/20/17 0705 10/20/17 0728   BP: (!) 148/90 128/86   BP Location: Right arm Right arm   Patient Position: Sitting Sitting   BP Method: Large (Automatic) Large (Manual)   Pulse: 64    Temp: 98.1 °F (36.7 °C)    TempSrc: Oral    Weight: 120.8 kg (266 lb 5.1 oz)    Height: 6' 2" (1.88 m)      Physical Exam   Constitutional: He is oriented to person, place, and time. Vital signs are normal. He appears well-developed. No distress.   HENT:   Head: Normocephalic and atraumatic.   Right Ear: Tympanic membrane, external ear and ear canal normal.   Left Ear: Tympanic membrane, external ear and ear canal normal.   Nose: Mucosal edema present. Right sinus exhibits no maxillary sinus tenderness and no frontal sinus tenderness. Left sinus exhibits no maxillary sinus tenderness and no frontal sinus tenderness.   Mouth/Throat: Oropharynx is clear and moist and mucous membranes are normal. No oropharyngeal exudate.   Nasal mucosa pale and boggy bilaterally, oropharynx crowding noted   Eyes: Conjunctivae are normal. Pupils are equal, round, and reactive to light. Right eye exhibits no discharge. Left eye exhibits no discharge. Right conjunctiva is not injected. Left conjunctiva is not " injected.   Neck: Normal range of motion. Neck supple. Normal carotid pulses present. Carotid bruit is not present.   Cardiovascular: Normal rate, regular rhythm, S1 normal, S2 normal, normal heart sounds and intact distal pulses.  Exam reveals no gallop and no friction rub.    No murmur heard.  Pulses:       Carotid pulses are 2+ on the right side, and 2+ on the left side.       Radial pulses are 2+ on the right side, and 2+ on the left side.        Posterior tibial pulses are 2+ on the right side, and 2+ on the left side.   No edema   Pulmonary/Chest: Effort normal and breath sounds normal. No respiratory distress. He has no decreased breath sounds. He has no wheezes. He has no rhonchi. He has no rales.   Abdominal: Soft. Bowel sounds are normal. He exhibits no distension, no abdominal bruit, no pulsatile midline mass and no mass. There is no hepatosplenomegaly. There is no tenderness. No hernia.   Musculoskeletal: Normal range of motion. He exhibits no edema, tenderness or deformity.   Lymphadenopathy:     He has no cervical adenopathy.   Neurological: He is alert and oriented to person, place, and time. He has normal strength.   Skin: Skin is warm, dry and intact. Capillary refill takes less than 2 seconds. No rash noted. He is not diaphoretic. No pallor.   Psychiatric: He has a normal mood and affect. His speech is normal and behavior is normal. Judgment and thought content normal. His mood appears not anxious. Cognition and memory are normal. He does not exhibit a depressed mood.   Nursing note and vitals reviewed.      Assessment:       1. Annual physical exam    2. Essential hypertension    3. Hyperlipidemia, unspecified hyperlipidemia type    4. Prostate cancer    5. Gastroesophageal reflux disease without esophagitis    6. Class 1 obesity due to excess calories with serious comorbidity and body mass index (BMI) of 34.0 to 34.9 in adult    7. Allergic sinusitis    8. High risk medication use    9. Need for  immunization against influenza        Plan:     Rodo was seen today for hypertension.    Diagnoses and all orders for this visit:    Annual physical exam   Encouraged to follow through with sleep study  Essential hypertension  Comments:  Stable, continue current medications  Orders:  -     valsartan-hydrochlorothiazide (DIOVAN-HCT) 160-25 mg per tablet; Take 1 tablet by mouth once daily.  -     CBC auto differential; Future  -     Comprehensive metabolic panel; Future  - Educational handouts provided. Controlling your blood pressure  - Discussed low salt diet, DASH diet, weight loss, exercise    Hyperlipidemia, unspecified hyperlipidemia type  -     Lipid panel; Future  -     atorvastatin (LIPITOR) 40 MG tablet; Take 1 tablet (40 mg total) by mouth once daily.    Prostate cancer  -     PSA, total and free; Future  - In remission, followed by urology Dr. Valles, last PSA one year ago    Gastroesophageal reflux disease without esophagitis  -     omeprazole (PRILOSEC) 40 MG capsule; Take 1 capsule (40 mg total) by mouth every morning.  -     CBC auto differential; Future    Class 1 obesity due to excess calories with serious comorbidity and body mass index (BMI) of 34.0 to 34.9 in adult   Weight loss recommended with well balanced diet and portion controlled meals and increased activity.    Educational handouts provided. Weight loss, exercise  Allergic sinusitis  -     fluticasone (FLONASE) 50 mcg/actuation nasal spray; 2 sprays by Each Nare route once daily.  - Advised can add an OTC oral antihistamine like claritin as needed    High risk medication use  -     CBC auto differential; Future  -     Comprehensive metabolic panel; Future    Need for immunization against influenza  -     Influenza - Quadrivalent (3 years & older) (PF)    Return in about 1 year (around 10/20/2018) for follow up .     Patient readiness: acceptance and barriers:none    During the course of the visit the patient was educated and counseled  about the following:     Hypertension:   Medication: no change.  Obesity:   General weight loss/lifestyle modification strategies discussed (elicit support from others; identify saboteurs; non-food rewards, etc).  Informal exercise measures discussed, e.g. taking stairs instead of elevator.    Goals: Hypertension: Reduce Blood Pressure and Obesity: Reduce calorie intake and BMI    Did patient meet goals/outcomes: No    The following self management tools provided: declined    Patient Instructions (the written plan) was given to the patient/family.     Time spent with patient: 30 minutes

## 2017-12-04 ENCOUNTER — TELEPHONE (OUTPATIENT)
Dept: FAMILY MEDICINE | Facility: CLINIC | Age: 57
End: 2017-12-04

## 2017-12-04 DIAGNOSIS — R94.31 ABNORMAL EKG: Primary | ICD-10-CM

## 2017-12-04 NOTE — TELEPHONE ENCOUNTER
----- Message from Tayler Lim sent at 12/1/2017  4:04 PM CST -----  Contact: Self  Patient is requesting a call back at [phone].  He is having difficulties he only wants to discuss with you.  Thanks

## 2017-12-04 NOTE — TELEPHONE ENCOUNTER
I compared with older ekg's.  It looks like he has a new inverted T in V6, old inverted T in III.  This could indicate left heart wall ischemia.  Was he having symptoms or was this a routine ekg?  If having symptoms go to ER, if not would take films to his cardiologist for review and possible further testing.  He has had these changes in the past, they seem to come and go.

## 2017-12-12 ENCOUNTER — TELEPHONE (OUTPATIENT)
Dept: FAMILY MEDICINE | Facility: CLINIC | Age: 57
End: 2017-12-12

## 2017-12-12 NOTE — TELEPHONE ENCOUNTER
Patient called- he was told by sister there are some family members who have MVP. He will keep appt with cardio Dr. Carter. He will go to er if symptoms of heart attack.

## 2017-12-12 NOTE — TELEPHONE ENCOUNTER
----- Message from Melvin Sethi sent at 12/12/2017 12:56 PM CST -----  Contact: same  Patient called in again and wanted to give Kathie some background family history before his appt with Dr. Carter on 1/15/18.  Patient call back number is [phone]

## 2017-12-12 NOTE — TELEPHONE ENCOUNTER
----- Message from Alexa Helm sent at 12/12/2017  9:10 AM CST -----  Contact: patient  Patient returning call. requesting a call back at [phone]. Thanks,

## 2018-01-15 ENCOUNTER — OFFICE VISIT (OUTPATIENT)
Dept: CARDIOLOGY | Facility: CLINIC | Age: 58
End: 2018-01-15
Payer: COMMERCIAL

## 2018-01-15 VITALS
SYSTOLIC BLOOD PRESSURE: 151 MMHG | OXYGEN SATURATION: 98 % | HEIGHT: 74 IN | BODY MASS INDEX: 34.75 KG/M2 | RESPIRATION RATE: 16 BRPM | HEART RATE: 65 BPM | DIASTOLIC BLOOD PRESSURE: 91 MMHG | WEIGHT: 270.75 LBS

## 2018-01-15 DIAGNOSIS — I10 HYPERTENSION, UNSPECIFIED TYPE: Primary | ICD-10-CM

## 2018-01-15 DIAGNOSIS — I51.7 LEFT VENTRICULAR HYPERTROPHY: ICD-10-CM

## 2018-01-15 DIAGNOSIS — I10 HYPERTENSION, UNSPECIFIED TYPE: ICD-10-CM

## 2018-01-15 PROCEDURE — 93000 ELECTROCARDIOGRAM COMPLETE: CPT | Mod: S$GLB,,, | Performed by: INTERNAL MEDICINE

## 2018-01-15 PROCEDURE — 99999 PR PBB SHADOW E&M-EST. PATIENT-LVL III: CPT | Mod: PBBFAC,,, | Performed by: INTERNAL MEDICINE

## 2018-01-15 PROCEDURE — 99204 OFFICE O/P NEW MOD 45 MIN: CPT | Mod: S$GLB,,, | Performed by: INTERNAL MEDICINE

## 2018-01-15 NOTE — PROGRESS NOTES
[facility]    CC: Abnormal EKG  Chief Complaint   Patient presents with    Consult     Dr. Rasmussen       Patient ID: Rodo Luna is a 57 y.o. male with a past medical history of hypertension    HPI  This gentleman has been referred for an abnormal EKG.  His EKG shows sinus rhythm with possible LVH voltage criteria.  He works offshore.  He occasionally gets shortness of breath when he climbs stairs there.  He has had a stress test in 2013 the results are dictated below.    Past Medical History:   Diagnosis Date    Allergy     GERD (gastroesophageal reflux disease)     Hematuria     History of brachytherapy     Hyperlipidemia     Hypertension     Primary adenocarcinoma of prostate     nik 6, s/p brachytherapy    [facility]) [facility]2016    [facility]    Varicose veins of leg with swelling 5/13/2013    Wears glasses      Past Surgical History:   Procedure Laterality Date    COLONOSCOPY  6/12/12    Dr. Lau 5 year recheck    COLONOSCOPY N/A 5/9/2017    Procedure: COLONOSCOPY;  Surgeon: Louis Lau MD;  [facility];  Service: Endoscopy;  Laterality: N/A;  personal history of colon polyps  family history of colon cancer    CYSTOSCOPY      LIPOMA RESECTION      RIGHT THIGH    TONSILLECTOMY       Social History     Social History    Marital status:      Spouse name: N/A    Number of children: N/A    Years of education: N/A     Occupational History    Not on file.     Social History Main Topics    Smoking status: Never Smoker    Smokeless tobacco: Never Used    Alcohol use 6.0 oz/week     12 Standard drinks or equivalent per week      Comment: SOCIAL    Drug use: No    Sexual activity: Not on file     Other Topics Concern    Not on file     Social History Narrative    No narrative on file     Family History   Problem Relation Age of Onset    Arthritis Mother     Hyperlipidemia Mother     Arthritis Father     Hyperlipidemia Father      "Arthritis Sister     Hyperlipidemia Sister     Sinusitis Daughter     Hyperlipidemia Daughter     Arthritis Maternal Grandmother     Stroke Maternal Grandmother     Prostate cancer Neg Hx     Urolithiasis Neg Hx     Kidney cancer Neg Hx        Review of patient's allergies indicates:   Allergen Reactions    Doxycycline      Other reaction(s): Rash  Other reaction(s): Rash    Phenytoin sodium extended      Other reaction(s): Bright light    Sulfa (sulfonamide antibiotics)        Medication List with Changes/Refills   Current Medications    ASPIRIN (ECOTRIN) 81 MG EC TABLET    Take 81 mg by mouth once daily.    ATORVASTATIN (LIPITOR) 40 MG TABLET    Take 1 tablet (40 mg total) by mouth once daily.    FLUTICASONE (FLONASE) 50 MCG/ACTUATION NASAL SPRAY    2 sprays by Each Nare route once daily.    OMEPRAZOLE (PRILOSEC) 40 MG CAPSULE    Take 1 capsule (40 mg total) by mouth every morning.    VALSARTAN-HYDROCHLOROTHIAZIDE (DIOVAN-HCT) 160-25 MG PER TABLET    Take 1 tablet by mouth once daily.       Review of Systems  Constitution: Denies chills, fever, and sweats.  HENT: Denies headaches or blurry vision.  Cardiovascular: Denies chest pain or irregular heart beat.  Respiratory: Denies cough or shortness of breath.  Gastrointestinal: Denies abdominal pain, nausea, or vomiting.  Musculoskeletal: Denies muscle cramps.  Neurological: Denies dizziness or focal weakness.  Psychiatric/Behavioral: Normal mental status.  Hematologic/Lymphatic: Denies bleeding problem or easy bruising/bleeding.  Skin: Denies rash or suspicious lesions    Physical Examination  BP (!) 151/91 (BP Location: Right arm, Patient Position: Sitting)   Pulse 65   Resp 16   Ht 6' 2" (1.88 m)   Wt 122.8 kg (270 lb 11.6 oz)   SpO2 98%   BMI 34.76 kg/m²     Constitutional: No acute distress, conversant  HEENT: Sclera anicteric, Pupils equal, round and reactive to light, extraocular motions intact, Oropharynx clear  Neck: No JVD, no carotid " bruits  Cardiovascular: regular rate and rhythm, no murmur, rubs or gallops, normal S1/S2  Pulmonary: Clear to auscultation bilaterally  Abdominal: Abdomen soft, nontender, nondistended, positive bowel sounds  Extremities: No lower extremity edema,   Pulses:  Carotid pulses are 2+ on the right side, and 2+ on the left side.  Radial pulses are 2+ on the right side, and 2+ on the left side.   Femoral pulses are 2+ on the right side, and 2+ on the left side.  Popliteal pulses are 2+ on the right side, and 2+ on the left side.   Dorsalis pedis pulses are 2+ on the right side, and 2+ on the left side.   Posterior tibial pulses are 2+ on the right side, and 2+ on the left side.    Skin: No ecchymosis, erythema, or ulcers  Psych: Alert and oriented x 3, appropriate affect  Neuro: CNII-XII intact, no focal deficits    Labs:  Most Recent Data  CBC:   Lab Results   Component Value Date    WBC 8.05 10/20/2017    HGB 14.8 10/20/2017    HCT 44.5 10/20/2017     10/20/2017    MCV 89 10/20/2017    RDW 13.4 10/20/2017     BMP:   Lab Results   Component Value Date     10/20/2017    K 4.1 10/20/2017     10/20/2017    CO2 28 10/20/2017    BUN 15 10/20/2017    CREATININE 1.2 10/20/2017    GLU 93 10/20/2017    CALCIUM 9.7 10/20/2017     LFTS;   Lab Results   Component Value Date    PROT 7.8 10/20/2017    ALBUMIN 4.0 10/20/2017    BILITOT 0.8 10/20/2017    AST 21 10/20/2017    ALKPHOS 73 10/20/2017    ALT 25 10/20/2017     COAGS:   Lab Results   Component Value Date    INR 1.0 12/08/2014     FLP:   Lab Results   Component Value Date    CHOL 178 10/20/2017    HDL 67 10/20/2017    LDLCALC 93.8 10/20/2017    TRIG 86 10/20/2017    CHOLHDL 37.6 10/20/2017     CARDIAC:   Lab Results   Component Value Date    BNP 17 04/17/2013       Imaging:    EKG: Normal sinus rhythm.  LVH by voltage criteria.  Nonspecific ST-T wave changes.      Echo:    Stress Testing:    CONCLUSIONS     1 - Normal left ventricular function (EF 65%).     2  - Normal diastolic function.     No evidence of stress induced myocardial ischemia.   I have personally reviewed these images and echo data    Assessment/Plan:  Rodo was seen today for consult.    Diagnoses and all orders for this visit:    Hypertension, unspecified type  -     EKG 12-lead  -     2D Echo w/ Color Flow Doppler; Future    Left ventricular hypertrophy        I will see him back with the results of the above.      Recommendations regarding healthy lifestyle, salt intake, diet and exercise given to the patient    Total duration of face to face visit time 45 minutes.  Total time spent counseling greater than fifty percent of total visit time.  Counseling included discussion regarding imaging findings, diagnosis, possibilities, treatment options, risks and benefits.  The patient had many questions regarding the options and long-term effect which were all answered to my best ability.      Raciel Carter MD,MRCP,RPVI,FACC,FSCAI.  Interventional Cardiology   Phone [phone]

## 2018-01-15 NOTE — LETTER
January 15, 2018      Abad Rasmussen MD  [address]  [place] [address]           [facility] [place] [facility] [place] - Cardiology  [address], [address]  [place] [address]  Phone: [phone]          Patient: Rodo Luna   MR Number: [id number]   YOB: 1960   Date of Visit: 1/15/2018       Dear Dr. Abad Rasmussen:    Thank you for referring Rodo Luna to me for evaluation. Attached you will find relevant portions of my assessment and plan of care.    If you have questions, please do not hesitate to call me. I look forward to following Rodo Luna along with you.    Sincerely,    Raciel Carter MD    Enclosure  CC:  No Recipients    If you would like to receive this communication electronically, please contact [email or web address] or [phone] to request more information on [employer] access.    For providers and/or their staff who would like to refer a patient to [facility], please contact us through our one-stop-shop provider referral line, [facility] , at [phone].    If you feel you have received this communication in error or would no longer like to receive these types of communications, please e-mail [email or web address]

## 2018-01-16 ENCOUNTER — CLINICAL SUPPORT (OUTPATIENT)
Dept: CARDIOLOGY | Facility: CLINIC | Age: 58
End: 2018-01-16
Attending: INTERNAL MEDICINE
Payer: COMMERCIAL

## 2018-01-16 DIAGNOSIS — I10 HYPERTENSION, UNSPECIFIED TYPE: ICD-10-CM

## 2018-01-16 PROCEDURE — 93306 TTE W/DOPPLER COMPLETE: CPT | Mod: S$GLB,,, | Performed by: INTERNAL MEDICINE

## 2018-01-18 LAB
DIASTOLIC DYSFUNCTION: NO
ESTIMATED PA SYSTOLIC PRESSURE: 25.09
MITRAL VALVE REGURGITATION: NORMAL
RETIRED EF AND QEF - SEE NOTES: 55 (ref 55–65)
TRICUSPID VALVE REGURGITATION: NORMAL

## 2018-04-23 ENCOUNTER — OFFICE VISIT (OUTPATIENT)
Dept: UROLOGY | Facility: CLINIC | Age: 58
End: 2018-04-23
Payer: COMMERCIAL

## 2018-04-23 VITALS
DIASTOLIC BLOOD PRESSURE: 91 MMHG | WEIGHT: 268.5 LBS | BODY MASS INDEX: 34.46 KG/M2 | TEMPERATURE: 98 F | HEART RATE: 57 BPM | RESPIRATION RATE: 18 BRPM | SYSTOLIC BLOOD PRESSURE: 147 MMHG | HEIGHT: 74 IN

## 2018-04-23 DIAGNOSIS — C61 PROSTATE CANCER: Primary | ICD-10-CM

## 2018-04-23 DIAGNOSIS — C61 MALIGNANT NEOPLASM OF PROSTATE: ICD-10-CM

## 2018-04-23 LAB
BILIRUB SERPL-MCNC: NORMAL MG/DL
BLOOD URINE, POC: NORMAL
COLOR, POC UA: YELLOW
GLUCOSE UR QL STRIP: NORMAL
KETONES UR QL STRIP: NORMAL
LEUKOCYTE ESTERASE URINE, POC: NORMAL
NITRITE, POC UA: NORMAL
PH, POC UA: 8
PROTEIN, POC: NORMAL
SPECIFIC GRAVITY, POC UA: 1
UROBILINOGEN, POC UA: NORMAL

## 2018-04-23 PROCEDURE — 99214 OFFICE O/P EST MOD 30 MIN: CPT | Mod: 25,S$GLB,, | Performed by: UROLOGY

## 2018-04-23 PROCEDURE — 3080F DIAST BP >= 90 MM HG: CPT | Mod: CPTII,S$GLB,, | Performed by: UROLOGY

## 2018-04-23 PROCEDURE — 3077F SYST BP >= 140 MM HG: CPT | Mod: CPTII,S$GLB,, | Performed by: UROLOGY

## 2018-04-23 PROCEDURE — 99999 PR PBB SHADOW E&M-EST. PATIENT-LVL III: CPT | Mod: PBBFAC,,, | Performed by: UROLOGY

## 2018-04-23 PROCEDURE — 81002 URINALYSIS NONAUTO W/O SCOPE: CPT | Mod: S$GLB,,, | Performed by: UROLOGY

## 2018-04-23 NOTE — PROGRESS NOTES
Subjective:       Patient ID: Rodo Luna is a 58 y.o. male.    Chief Complaint:   OFFICE NOTE    CHIEF COMPLAINT:  Prostate cancer.    HISTORY OF PRESENT ILLNESS:  Mr. Jeremy garcia is a 58-year-old male last seen in   our office in 11/2016.  This patient in 2012 was diagnosed and treated for   prostate cancer using brachytherapy.  The patient is here for his followup   visit.  He is doing well.  He is not having any significant issues.  The patient   denies any urinary symptoms.  He has no nocturia, dysuria or hematuria.  The   flow is adequate and he feels like he can empty the bladder satisfactorily.  The   patient denies any blood in the stool or difficulty with bowel movements.    Also, denies any bone pain.    The family history is negative for prostate cancer.    The past medical and surgical history are well documented in the medical record,   including medications and allergies and all these was reviewed by me during   this visit.  The patient has no changes.    The urinalysis today is clear.  The last PSA was in 10/2017 0.06.      EOR/HN  dd: 04/23/2018 11:17:33 (CDT)  td: 04/24/2018 03:24:40 (CDT)  Doc ID   #[id number]  Job ID #[id number]    CC:       HPI  Review of Systems   Constitutional: Negative for activity change and appetite change.   HENT: Negative.    Eyes: Negative for discharge.   Respiratory: Negative for cough and shortness of breath.    Cardiovascular: Negative for chest pain and palpitations.   Gastrointestinal: Negative for abdominal distention, abdominal pain, constipation and vomiting.   Genitourinary: Negative for discharge, dysuria, flank pain, frequency, hematuria, testicular pain and urgency.   Musculoskeletal: Negative for arthralgias.   Skin: Negative for rash.   Neurological: Negative for dizziness.   Psychiatric/Behavioral: The patient is not nervous/anxious.        Objective:      Physical Exam   Constitutional: He appears well-developed and well-nourished.   HENT:   Head:  Normocephalic.   Eyes: Pupils are equal, round, and reactive to light.   Neck: Normal range of motion.   Cardiovascular: Normal rate.    Pulmonary/Chest: Effort normal.   Abdominal: Soft. He exhibits no distension and no mass. There is no tenderness. Hernia confirmed negative in the right inguinal area and confirmed negative in the left inguinal area.   Genitourinary: Rectum normal, prostate normal and penis normal. Rectal exam shows no external hemorrhoid, no mass and no tenderness. Prostate is not enlarged and not tender. Right testis shows no mass and no tenderness. Left testis shows no mass and no tenderness. No discharge found.   Musculoskeletal: Normal range of motion.   Neurological: He is alert.   Skin: Skin is warm.     Psychiatric: He has a normal mood and affect.       Assessment:       1. Prostate cancer    2. Malignant neoplasm of prostate        Plan:       Prostate cancer    Malignant neoplasm of prostate  -     POCT URINE DIPSTICK WITHOUT MICROSCOPE    Stable PCA after treatment with brachytherapy 6 yrs ago. RTC 1 yr

## 2018-07-25 ENCOUNTER — TELEPHONE (OUTPATIENT)
Dept: FAMILY MEDICINE | Facility: CLINIC | Age: 58
End: 2018-07-25

## 2018-07-25 NOTE — TELEPHONE ENCOUNTER
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

## 2018-07-25 NOTE — TELEPHONE ENCOUNTER
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

## 2018-07-25 NOTE — TELEPHONE ENCOUNTER
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

## 2018-07-26 ENCOUNTER — OFFICE VISIT (OUTPATIENT)
Dept: FAMILY MEDICINE | Facility: CLINIC | Age: 58
End: 2018-07-26
Payer: COMMERCIAL

## 2018-07-26 VITALS
HEIGHT: 74 IN | WEIGHT: 259.69 LBS | SYSTOLIC BLOOD PRESSURE: 122 MMHG | DIASTOLIC BLOOD PRESSURE: 76 MMHG | HEART RATE: 60 BPM | BODY MASS INDEX: 33.33 KG/M2 | RESPIRATION RATE: 15 BRPM

## 2018-07-26 DIAGNOSIS — M54.2 NECK AND SHOULDER PAIN: Primary | ICD-10-CM

## 2018-07-26 DIAGNOSIS — M25.519 NECK AND SHOULDER PAIN: Primary | ICD-10-CM

## 2018-07-26 DIAGNOSIS — M25.512 TRIGGER POINT OF LEFT SHOULDER REGION: ICD-10-CM

## 2018-07-26 PROCEDURE — 20552 NJX 1/MLT TRIGGER POINT 1/2: CPT | Mod: S$GLB,,, | Performed by: PHYSICIAN ASSISTANT

## 2018-07-26 PROCEDURE — 3008F BODY MASS INDEX DOCD: CPT | Mod: CPTII,S$GLB,, | Performed by: PHYSICIAN ASSISTANT

## 2018-07-26 PROCEDURE — 99999 PR PBB SHADOW E&M-EST. PATIENT-LVL III: CPT | Mod: PBBFAC,,, | Performed by: PHYSICIAN ASSISTANT

## 2018-07-26 PROCEDURE — 99213 OFFICE O/P EST LOW 20 MIN: CPT | Mod: 25,S$GLB,, | Performed by: PHYSICIAN ASSISTANT

## 2018-07-26 PROCEDURE — 3074F SYST BP LT 130 MM HG: CPT | Mod: CPTII,S$GLB,, | Performed by: PHYSICIAN ASSISTANT

## 2018-07-26 PROCEDURE — 3078F DIAST BP <80 MM HG: CPT | Mod: CPTII,S$GLB,, | Performed by: PHYSICIAN ASSISTANT

## 2018-07-26 RX ORDER — TRIAMCINOLONE ACETONIDE 40 MG/ML
40 INJECTION, SUSPENSION INTRA-ARTICULAR; INTRAMUSCULAR
Status: COMPLETED | OUTPATIENT
Start: 2018-07-26 | End: 2018-07-26

## 2018-07-26 RX ADMIN — TRIAMCINOLONE ACETONIDE 40 MG: 40 INJECTION, SUSPENSION INTRA-ARTICULAR; INTRAMUSCULAR at 05:07

## 2018-07-26 NOTE — LETTER
July 26, 2018      [place] [facility]  [address] [place] [address]  Phone: [phone]  Fax: [phone]       Patient: Rodo Luna   YOB: 1960  Date of Visit: 07/26/2018    To Whom It May Concern:    Tyler Luna  was at [facility] on 07/26/2018. He may return to work 7/28/2018 on With no restrictions. If you have any questions or concerns, or if I can be of further assistance, please do not hesitate to contact me.    Sincerely,          Amanda Person MA

## 2018-07-26 NOTE — PROGRESS NOTES
Subjective:       Patient ID: Rodo Luna is a 58 y.o. male.    Chief Complaint: Neck Pain (down to hand along with tingling (Left side))    HPI   L arm discomfort and tingling x 2 wks  Increased pain x 2 days  No recent trauma but pt did fall off roof 2 mos ago  Pain level 7 or 8  Can't sleep at night  Needs to return to work offshore  Review of Systems   Constitutional: Positive for activity change. Negative for appetite change, chills, diaphoresis, fatigue, fever and unexpected weight change.   HENT: Negative.    Eyes: Negative.    Respiratory: Negative.  Negative for cough and shortness of breath.    Cardiovascular: Negative.  Negative for chest pain and leg swelling.   Gastrointestinal: Negative.    Endocrine: Negative.    Genitourinary: Negative.    Musculoskeletal: Positive for myalgias, neck pain and neck stiffness. Negative for arthralgias, back pain, gait problem and joint swelling.   Skin: Negative.  Negative for rash.   Neurological: Positive for numbness.       Objective:      Physical Exam   Constitutional: He appears well-developed and well-nourished. No distress.   HENT:   Head: Normocephalic and atraumatic.   Eyes: Conjunctivae are normal. No scleral icterus.   Neck: Normal range of motion. No tracheal deviation present. No thyromegaly present.   Focal trigger point tenderness just above L msdial scapula   Musculoskeletal: He exhibits tenderness. He exhibits no edema.   Lymphadenopathy:     He has no cervical adenopathy.   Skin: Skin is warm and dry. No rash noted.   Vitals reviewed.      Assessment:       1. Neck and shoulder pain    2. Trigger point of left shoulder region        Plan:       Neck and shoulder pain  -     triamcinolone acetonide injection 40 mg; Inject 1 mL (40 mg total) into the muscle one time.    Trigger point of left shoulder region  -     triamcinolone acetonide injection 40 mg; Inject 1 mL (40 mg total) into the muscle one time.    discussed otc's  Discussed home PT  Pt  offered trigger point injection and pt. Wishes to proceed with surgery    Prepped site  Injected L shoulder trigger point with 40 mg Kenalog mixed in 5 cc's of 2% lidocaine  Pt tolerated procedure well  Within 15 mins pain reduced to 1 or 2 level

## 2018-07-26 NOTE — LETTER
July 26, 2018      [place] [facility]  [address] [place] [address]  Phone: [phone]  Fax: [phone]       Patient: Rodo Luna   YOB: 1960  Date of Visit: 07/26/2018    To Whom It May Concern:    Tyler Luna  was at [facility] on 07/26/2018. He  may return to work on 7/27/2018 with no restrictions. If you have any questions or concerns, or if I can be of further assistance, please do not hesitate to contact me.    Sincerely,            Amanda Person MA

## 2018-07-30 ENCOUNTER — TELEPHONE (OUTPATIENT)
Dept: FAMILY MEDICINE | Facility: CLINIC | Age: 58
End: 2018-07-30

## 2018-07-30 DIAGNOSIS — I10 ESSENTIAL HYPERTENSION: ICD-10-CM

## 2018-07-30 RX ORDER — LOSARTAN POTASSIUM AND HYDROCHLOROTHIAZIDE 12.5; 5 MG/1; MG/1
1 TABLET ORAL DAILY
Qty: 30 TABLET | Refills: 3 | Status: SHIPPED | OUTPATIENT
Start: 2018-07-30 | End: 2018-11-29 | Stop reason: SDUPTHER

## 2018-07-30 NOTE — TELEPHONE ENCOUNTER
----- Message from Roxanna Florentino sent at 7/30/2018 12:03 PM CDT -----  Contact: self  Needs to discuss Valsartan recall.  Patient will continue taking until he hears from you. He states his prescription is part of the recall per [facility].  Please call back at  or  work.       [facility]/pharmacy #[id number] - [place] - [address]  [address] [place] [address]  Phone: [phone] Fax: [phone]

## 2018-07-30 NOTE — TELEPHONE ENCOUNTER
Sent in losartan hct 50-12.5, closest approximation but might be slightly weaker than current valsartan hct dose.  Needs to check bp in 3-4 weeks before refilling it

## 2018-08-08 ENCOUNTER — HOSPITAL ENCOUNTER (OUTPATIENT)
Dept: RADIOLOGY | Facility: CLINIC | Age: 58
Discharge: HOME | End: 2018-08-08
Attending: PHYSICIAN ASSISTANT
Payer: COMMERCIAL

## 2018-08-08 ENCOUNTER — OFFICE VISIT (OUTPATIENT)
Dept: FAMILY MEDICINE | Facility: CLINIC | Age: 58
End: 2018-08-08
Payer: COMMERCIAL

## 2018-08-08 VITALS
DIASTOLIC BLOOD PRESSURE: 77 MMHG | TEMPERATURE: 98 F | HEIGHT: 74 IN | HEART RATE: 67 BPM | OXYGEN SATURATION: 97 % | SYSTOLIC BLOOD PRESSURE: 119 MMHG | BODY MASS INDEX: 33.42 KG/M2 | WEIGHT: 260.38 LBS

## 2018-08-08 DIAGNOSIS — M54.2 NECK AND SHOULDER PAIN: ICD-10-CM

## 2018-08-08 DIAGNOSIS — M50.30 DDD (DEGENERATIVE DISC DISEASE), CERVICAL: ICD-10-CM

## 2018-08-08 DIAGNOSIS — M62.838 CERVICAL PARASPINAL MUSCLE SPASM: ICD-10-CM

## 2018-08-08 DIAGNOSIS — M54.2 NECK AND SHOULDER PAIN: Primary | ICD-10-CM

## 2018-08-08 DIAGNOSIS — M25.519 NECK AND SHOULDER PAIN: Primary | ICD-10-CM

## 2018-08-08 DIAGNOSIS — M25.519 NECK AND SHOULDER PAIN: ICD-10-CM

## 2018-08-08 PROCEDURE — 3078F DIAST BP <80 MM HG: CPT | Mod: CPTII,S$GLB,, | Performed by: PHYSICIAN ASSISTANT

## 2018-08-08 PROCEDURE — 72050 X-RAY EXAM NECK SPINE 4/5VWS: CPT | Mod: 26,,, | Performed by: RADIOLOGY

## 2018-08-08 PROCEDURE — 72050 X-RAY EXAM NECK SPINE 4/5VWS: CPT | Mod: TC,FY,PO

## 2018-08-08 PROCEDURE — 3008F BODY MASS INDEX DOCD: CPT | Mod: CPTII,S$GLB,, | Performed by: PHYSICIAN ASSISTANT

## 2018-08-08 PROCEDURE — 3074F SYST BP LT 130 MM HG: CPT | Mod: CPTII,S$GLB,, | Performed by: PHYSICIAN ASSISTANT

## 2018-08-08 PROCEDURE — 99999 PR PBB SHADOW E&M-EST. PATIENT-LVL IV: CPT | Mod: PBBFAC,,, | Performed by: PHYSICIAN ASSISTANT

## 2018-08-08 PROCEDURE — 99213 OFFICE O/P EST LOW 20 MIN: CPT | Mod: S$GLB,,, | Performed by: PHYSICIAN ASSISTANT

## 2018-08-08 RX ORDER — TIZANIDINE 2 MG/1
2 TABLET ORAL EVERY 8 HOURS PRN
Qty: 30 TABLET | Refills: 0 | Status: SHIPPED | OUTPATIENT
Start: 2018-08-08 | End: 2018-08-18

## 2018-08-08 NOTE — PROGRESS NOTES
Subjective:       Patient ID: Rodo Luna is a 58 y.o. male.    Chief Complaint: Spasms    HPI   L neck and shoulder pain  Pt has done well since trigger point injection about 2 wks ago  Has pain down L posterior  upper arm  Stiffness and discomfort is returning in L shoulder  Review of Systems   Constitutional: Positive for activity change. Negative for appetite change, chills, diaphoresis, fatigue, fever and unexpected weight change.   HENT: Negative.    Eyes: Negative.    Respiratory: Negative.    Cardiovascular: Negative.    Gastrointestinal: Negative.    Endocrine: Negative.    Genitourinary: Negative.    Musculoskeletal: Positive for myalgias, neck pain and neck stiffness. Negative for arthralgias, back pain, gait problem and joint swelling.   Skin: Negative.  Negative for rash.   Neurological: Negative.        Objective:      Physical Exam   Constitutional: He appears well-developed and well-nourished. No distress.   HENT:   Head: Normocephalic and atraumatic.   Eyes: Conjunctivae are normal. No scleral icterus.   Neck: No tracheal deviation present. No thyromegaly present.   Tight and tender L paracervical muscles  No vertebral tenderness noted  Decreased ROM on rotation to the L with discomfort at max rotation     Musculoskeletal: He exhibits tenderness. He exhibits no edema.   Lymphadenopathy:     He has no cervical adenopathy.   Skin: Skin is warm and dry. No rash noted.   Vitals reviewed.      Assessment:       1. Neck and shoulder pain    2. Cervical paraspinal muscle spasm    3. DDD (degenerative disc disease), cervical        Plan:       Neck and shoulder pain  -     X-Ray Cervical Spine Complete 5 view; Future; Expected date: 08/08/2018  -     tiZANidine (ZANAFLEX) 2 MG tablet; Take 1 tablet (2 mg total) by mouth every 8 (eight) hours as needed.  Dispense: 30 tablet; Refill: 0    Cervical paraspinal muscle spasm  -     X-Ray Cervical Spine Complete 5 view; Future; Expected date: 08/08/2018  -      tiZANidine (ZANAFLEX) 2 MG tablet; Take 1 tablet (2 mg total) by mouth every 8 (eight) hours as needed.  Dispense: 30 tablet; Refill: 0    DDD (degenerative disc disease), cervical  -     X-Ray Cervical Spine Complete 5 view; Future; Expected date: 08/08/2018  -     tiZANidine (ZANAFLEX) 2 MG tablet; Take 1 tablet (2 mg total) by mouth every 8 (eight) hours as needed.  Dispense: 30 tablet; Refill: 0    discussed otc's including Aleve  Discussed home PT  Discussed PT referral but pt's sister is a PT and he will see her

## 2018-08-09 ENCOUNTER — TELEPHONE (OUTPATIENT)
Dept: ENDOCRINOLOGY | Facility: CLINIC | Age: 58
End: 2018-08-09

## 2018-08-09 DIAGNOSIS — M54.2 NECK PAIN: Primary | ICD-10-CM

## 2018-08-09 DIAGNOSIS — R93.7 ABNORMAL X-RAY OF CERVICAL SPINE: ICD-10-CM

## 2018-08-09 DIAGNOSIS — M50.30 DDD (DEGENERATIVE DISC DISEASE), CERVICAL: ICD-10-CM

## 2018-08-09 NOTE — TELEPHONE ENCOUNTER
Please call and notify pt his neck xray shows further disk degeneration  I recommend we obtain a MRI of your neck  I have ordered one and my nurse will call you and help get it scheduled

## 2018-08-13 ENCOUNTER — TELEPHONE (OUTPATIENT)
Dept: FAMILY MEDICINE | Facility: CLINIC | Age: 58
End: 2018-08-13

## 2018-08-13 NOTE — TELEPHONE ENCOUNTER
Discussed on separate encounter.     ----- Message from Phillip Harvey sent at 8/10/2018  2:22 PM CDT -----  Contact: pt  Pt is requesting a callback with his test results,pls call back and advise   Call Back#[phone]  Thanks

## 2018-08-13 NOTE — TELEPHONE ENCOUNTER
"Called pt regarding below message. Informed pt of results and recommendations per Rogerio ALVAREZ. "Please call and notify pt his neck xray shows further disk degeneration  I recommend we obtain a MRI of your neck  I have ordered one and my nurse will call you and help get it scheduled"  Scheduled MRI appt with Pt. Appt date, time, and location given. Pt verbalized understanding with no further questions.     ----- Message from Tayler Lim sent at 8/9/2018  2:50 PM CDT -----  Contact: Self  Type:  Test Results    Who Called:  patient  Name of Test (Lab/Mammo/Etc): xray  Date of Test: 8/8  Ordering Provider:  Dutch Ferrera  Where the test was performed: [facility]  Best Call Back Number: [phone]  Additional Information:  na       "

## 2018-08-14 ENCOUNTER — TELEPHONE (OUTPATIENT)
Dept: FAMILY MEDICINE | Facility: CLINIC | Age: 58
End: 2018-08-14

## 2018-08-14 ENCOUNTER — HOSPITAL ENCOUNTER (OUTPATIENT)
Dept: RADIOLOGY | Facility: HOSPITAL | Age: 58
Discharge: HOME | End: 2018-08-14
Attending: PHYSICIAN ASSISTANT
Payer: COMMERCIAL

## 2018-08-14 DIAGNOSIS — R93.7 ABNORMAL X-RAY OF CERVICAL SPINE: ICD-10-CM

## 2018-09-17 ENCOUNTER — TELEPHONE (OUTPATIENT)
Dept: FAMILY MEDICINE | Facility: CLINIC | Age: 58
End: 2018-09-17

## 2018-09-17 NOTE — TELEPHONE ENCOUNTER
----- Message from Yeny Duong LPN sent at 9/17/2018 11:45 AM CDT -----      ----- Message -----  From: Jrery Aponte  Sent: 9/17/2018  11:05 AM  To: Valery HILL Staff    Type:  Patient Returning Call    Who Called:  Patient  Who Left Message for Patient:  NA  Does the patient know what this is regarding?:  Appointment  Best Call Back Number:  [phone]  Additional Information:  Patient calling.  States that he needs a call back as soon as possible so he can contact his employer.

## 2018-09-17 NOTE — TELEPHONE ENCOUNTER
----- Message from Libertad Lagunas sent at 9/17/2018  9:58 AM CDT -----  Contact: self  Patient need to speak to nurse regarding patient states he has shoulder pain    Epic would not allow scheduling         Please call to advice [phone] (home) [phone] (work)

## 2018-09-17 NOTE — TELEPHONE ENCOUNTER
----- Message from Mary Anne Snowden sent at 9/17/2018 10:21 AM CDT -----  Type:  Patient Returning Call    Who Called:  Patient  Who Left Message for Patient:  IDALIA COSTA  Does the patient know what this is regarding?:  MRI  Best Call Back Number:  [phone]

## 2018-09-21 ENCOUNTER — TELEPHONE (OUTPATIENT)
Dept: FAMILY MEDICINE | Facility: CLINIC | Age: 58
End: 2018-09-21

## 2018-09-28 ENCOUNTER — TELEPHONE (OUTPATIENT)
Dept: FAMILY MEDICINE | Facility: CLINIC | Age: 58
End: 2018-09-28

## 2018-10-31 DIAGNOSIS — I10 HYPERTENSION, ESSENTIAL: Primary | ICD-10-CM

## 2018-10-31 DIAGNOSIS — K21.9 GASTROESOPHAGEAL REFLUX DISEASE WITHOUT ESOPHAGITIS: ICD-10-CM

## 2018-10-31 DIAGNOSIS — E78.5 HYPERLIPIDEMIA, UNSPECIFIED HYPERLIPIDEMIA TYPE: ICD-10-CM

## 2018-11-01 RX ORDER — OMEPRAZOLE 40 MG/1
CAPSULE, DELAYED RELEASE ORAL
Qty: 90 CAPSULE | Refills: 2 | Status: SHIPPED | OUTPATIENT
Start: 2018-11-01 | End: 2019-01-25 | Stop reason: SDUPTHER

## 2018-11-01 RX ORDER — ATORVASTATIN CALCIUM 40 MG/1
40 TABLET, FILM COATED ORAL DAILY
Qty: 30 TABLET | Refills: 0 | Status: SHIPPED | OUTPATIENT
Start: 2018-11-01 | End: 2018-11-30 | Stop reason: SDUPTHER

## 2018-11-29 DIAGNOSIS — I10 ESSENTIAL HYPERTENSION: ICD-10-CM

## 2018-11-29 RX ORDER — LOSARTAN POTASSIUM AND HYDROCHLOROTHIAZIDE 12.5; 5 MG/1; MG/1
TABLET ORAL
Qty: 30 TABLET | Refills: 0 | Status: SHIPPED | OUTPATIENT
Start: 2018-11-29 | End: 2018-12-30 | Stop reason: SDUPTHER

## 2018-11-30 DIAGNOSIS — E78.5 HYPERLIPIDEMIA, UNSPECIFIED HYPERLIPIDEMIA TYPE: ICD-10-CM

## 2018-11-30 RX ORDER — ATORVASTATIN CALCIUM 40 MG/1
TABLET, FILM COATED ORAL
Qty: 30 TABLET | Refills: 0 | Status: SHIPPED | OUTPATIENT
Start: 2018-11-30 | End: 2018-12-31 | Stop reason: SDUPTHER

## 2018-12-30 DIAGNOSIS — I10 ESSENTIAL HYPERTENSION: ICD-10-CM

## 2018-12-31 DIAGNOSIS — E78.5 HYPERLIPIDEMIA, UNSPECIFIED HYPERLIPIDEMIA TYPE: ICD-10-CM

## 2018-12-31 RX ORDER — LOSARTAN POTASSIUM AND HYDROCHLOROTHIAZIDE 12.5; 5 MG/1; MG/1
TABLET ORAL
Qty: 30 TABLET | Refills: 0 | Status: SHIPPED | OUTPATIENT
Start: 2018-12-31 | End: 2019-01-25 | Stop reason: DRUGHIGH

## 2018-12-31 RX ORDER — ATORVASTATIN CALCIUM 40 MG/1
TABLET, FILM COATED ORAL
Qty: 30 TABLET | Refills: 0 | Status: SHIPPED | OUTPATIENT
Start: 2018-12-31 | End: 2019-01-25 | Stop reason: SDUPTHER

## 2019-01-25 ENCOUNTER — LAB VISIT (OUTPATIENT)
Dept: LAB | Facility: HOSPITAL | Age: 59
End: 2019-01-25
Attending: NURSE PRACTITIONER
Payer: COMMERCIAL

## 2019-01-25 ENCOUNTER — OFFICE VISIT (OUTPATIENT)
Dept: FAMILY MEDICINE | Facility: CLINIC | Age: 59
End: 2019-01-25
Payer: COMMERCIAL

## 2019-01-25 VITALS
TEMPERATURE: 98 F | DIASTOLIC BLOOD PRESSURE: 90 MMHG | SYSTOLIC BLOOD PRESSURE: 140 MMHG | BODY MASS INDEX: 33.37 KG/M2 | HEART RATE: 72 BPM | WEIGHT: 260 LBS | HEIGHT: 74 IN

## 2019-01-25 DIAGNOSIS — K21.9 GASTROESOPHAGEAL REFLUX DISEASE WITHOUT ESOPHAGITIS: ICD-10-CM

## 2019-01-25 DIAGNOSIS — C61 PRIMARY ADENOCARCINOMA OF PROSTATE: ICD-10-CM

## 2019-01-25 DIAGNOSIS — I10 ESSENTIAL HYPERTENSION: ICD-10-CM

## 2019-01-25 DIAGNOSIS — Z79.899 HIGH RISK MEDICATION USE: ICD-10-CM

## 2019-01-25 DIAGNOSIS — Z00.00 ANNUAL PHYSICAL EXAM: Primary | ICD-10-CM

## 2019-01-25 DIAGNOSIS — E78.5 HYPERLIPIDEMIA, UNSPECIFIED HYPERLIPIDEMIA TYPE: ICD-10-CM

## 2019-01-25 LAB
ALBUMIN SERPL BCP-MCNC: 4.3 G/DL
ALP SERPL-CCNC: 68 U/L
ALT SERPL W/O P-5'-P-CCNC: 19 U/L
ANION GAP SERPL CALC-SCNC: 8 MMOL/L
AST SERPL-CCNC: 21 U/L
BILIRUB SERPL-MCNC: 0.7 MG/DL
BUN SERPL-MCNC: 12 MG/DL
CALCIUM SERPL-MCNC: 10.2 MG/DL
CHLORIDE SERPL-SCNC: 100 MMOL/L
CHOLEST SERPL-MCNC: 172 MG/DL
CHOLEST/HDLC SERPL: 2.7 {RATIO}
CO2 SERPL-SCNC: 30 MMOL/L
COMPLEXED PSA SERPL-MCNC: 0.05 NG/ML
CREAT SERPL-MCNC: 1.1 MG/DL
EST. GFR  (AFRICAN AMERICAN): >60 ML/MIN/1.73 M^2
EST. GFR  (NON AFRICAN AMERICAN): >60 ML/MIN/1.73 M^2
GLUCOSE SERPL-MCNC: 97 MG/DL
HDLC SERPL-MCNC: 63 MG/DL
HDLC SERPL: 36.6 %
LDLC SERPL CALC-MCNC: 89.4 MG/DL
NONHDLC SERPL-MCNC: 109 MG/DL
POTASSIUM SERPL-SCNC: 4.1 MMOL/L
PROT SERPL-MCNC: 8.1 G/DL
SODIUM SERPL-SCNC: 138 MMOL/L
TRIGL SERPL-MCNC: 98 MG/DL

## 2019-01-25 PROCEDURE — 3008F BODY MASS INDEX DOCD: CPT | Mod: CPTII,S$GLB,, | Performed by: NURSE PRACTITIONER

## 2019-01-25 PROCEDURE — 3008F PR BODY MASS INDEX (BMI) DOCUMENTED: ICD-10-PCS | Mod: CPTII,S$GLB,, | Performed by: NURSE PRACTITIONER

## 2019-01-25 PROCEDURE — 80061 LIPID PANEL: CPT

## 2019-01-25 PROCEDURE — 99396 PR PREVENTIVE VISIT,EST,40-64: ICD-10-PCS | Mod: S$GLB,,, | Performed by: NURSE PRACTITIONER

## 2019-01-25 PROCEDURE — 99396 PREV VISIT EST AGE 40-64: CPT | Mod: S$GLB,,, | Performed by: NURSE PRACTITIONER

## 2019-01-25 PROCEDURE — 99999 PR PBB SHADOW E&M-EST. PATIENT-LVL IV: ICD-10-PCS | Mod: PBBFAC,,, | Performed by: NURSE PRACTITIONER

## 2019-01-25 PROCEDURE — 84153 ASSAY OF PSA TOTAL: CPT

## 2019-01-25 PROCEDURE — 36415 COLL VENOUS BLD VENIPUNCTURE: CPT | Mod: PO

## 2019-01-25 PROCEDURE — 3078F DIAST BP <80 MM HG: CPT | Mod: CPTII,S$GLB,, | Performed by: NURSE PRACTITIONER

## 2019-01-25 PROCEDURE — 3077F PR MOST RECENT SYSTOLIC BLOOD PRESSURE >= 140 MM HG: ICD-10-PCS | Mod: CPTII,S$GLB,, | Performed by: NURSE PRACTITIONER

## 2019-01-25 PROCEDURE — 99999 PR PBB SHADOW E&M-EST. PATIENT-LVL IV: CPT | Mod: PBBFAC,,, | Performed by: NURSE PRACTITIONER

## 2019-01-25 PROCEDURE — 3078F PR MOST RECENT DIASTOLIC BLOOD PRESSURE < 80 MM HG: ICD-10-PCS | Mod: CPTII,S$GLB,, | Performed by: NURSE PRACTITIONER

## 2019-01-25 PROCEDURE — 3077F SYST BP >= 140 MM HG: CPT | Mod: CPTII,S$GLB,, | Performed by: NURSE PRACTITIONER

## 2019-01-25 PROCEDURE — 80053 COMPREHEN METABOLIC PANEL: CPT

## 2019-01-25 RX ORDER — LOSARTAN POTASSIUM AND HYDROCHLOROTHIAZIDE 12.5; 5 MG/1; MG/1
1 TABLET ORAL DAILY
Qty: 30 TABLET | Refills: 3 | Status: CANCELLED | OUTPATIENT
Start: 2019-01-25

## 2019-01-25 RX ORDER — LOSARTAN POTASSIUM AND HYDROCHLOROTHIAZIDE 12.5; 1 MG/1; MG/1
1 TABLET ORAL DAILY
Qty: 30 TABLET | Refills: 11 | Status: SHIPPED | OUTPATIENT
Start: 2019-01-25 | End: 2019-11-19 | Stop reason: SDUPTHER

## 2019-01-25 RX ORDER — ASPIRIN 81 MG/1
81 TABLET ORAL DAILY
Refills: 0 | COMMUNITY
Start: 2019-01-25 | End: 2019-05-03

## 2019-01-25 RX ORDER — ATORVASTATIN CALCIUM 40 MG/1
40 TABLET, FILM COATED ORAL DAILY
Qty: 30 TABLET | Refills: 11 | Status: SHIPPED | OUTPATIENT
Start: 2019-01-25 | End: 2019-11-19 | Stop reason: SDUPTHER

## 2019-01-25 RX ORDER — OMEPRAZOLE 40 MG/1
40 CAPSULE, DELAYED RELEASE ORAL EVERY MORNING
Qty: 30 CAPSULE | Refills: 11 | Status: SHIPPED | OUTPATIENT
Start: 2019-01-25 | End: 2020-03-06 | Stop reason: SDUPTHER

## 2019-01-25 NOTE — PATIENT INSTRUCTIONS

## 2019-01-25 NOTE — PROGRESS NOTES
Subjective:       Patient ID: Rodo Luna is a 58 y.o. male.    Chief Complaint: Annual Exam    Chief Complaint  Chief Complaint   Patient presents with    Annual Exam       HPI  Rodo Luna is a 58 y.o. male with medical diagnoses as listed in the medical history and problem list that presents for regular check up. He is currently being treated for hypertension, blood pressure today is 146/86 and 146/92. He is also treated for hyperlipidemia and GERD. He has prostate cancer, treated with brachytherapy, followed yearly by urology (Dr. Valles). His BMI is 33.38 and his weight is stable.  Established patient with last clinic appointment 8/8/2018.  Only complaint at this time is some difficulty staying asleep at night. States he goes to sleep around 7pm and wakes up at 2am unable to fall back asleep.  He does not want to be treated for this at this time.      PAST MEDICAL HISTORY:  Past Medical History:   Diagnosis Date    Allergy     GERD (gastroesophageal reflux disease)     Hematuria     History of brachytherapy     Hyperlipidemia     Hypertension     Primary adenocarcinoma of prostate     nik 6, s/p brachytherapy    URI (upper respiratory infection) 06/03/2016    [facility]    Varicose veins of leg with swelling 5/13/2013    Wears glasses        PAST SURGICAL HISTORY:  Past Surgical History:   Procedure Laterality Date    BIOPSY, PROSTATE N/A 7/17/2012    [facility]    BRACHYTHERAPY N/A 10/23/2012    [facility]    COLONOSCOPY  6/12/12    Dr. Lau 5 year recheck    COLONOSCOPY N/A 5/9/2017    [facility] ENDO    COLONOSCOPY N/A 6/12/2012    [facility]O    CYSTOSCOPY      CYSTOSCOPY N/A 5/12/2015    [facility]    EGD (ESOPHAGOGASTRODUODENOSCOPY) N/A 6/12/2012    [facility]formed [facility] at [facility] ENDO    EXCISION-MASS  Right 6/1/2012    [facility]    LIPOMA RESECTION      RIGHT THIGH    TONSILLECTOMY         SOCIAL HISTORY:  Social History     Socioeconomic History    Marital status:      Spouse name: Not on file    Number of children: Not on file    Years of education: Not on file    Highest education level: Not on file   Social Needs    Financial resource strain: Not on file    Food insecurity - worry: Not on file    Food insecurity - inability: Not on file    Transportation needs - medical: Not on file    Transportation needs - non-medical: Not on file   Occupational History    Not on file   Tobacco Use    Smoking status: Never Smoker    Smokeless tobacco: Never Used   Substance and Sexual Activity    Alcohol use: Yes     Alcohol/week: 6.0 oz     Types: 12 Standard drinks or equivalent per week     Comment: SOCIAL    Drug use: No    Sexual activity: Not on file   Other Topics Concern    Not on file   Social History Narrative    Not on file       FAMILY HISTORY:  Family History   Problem Relation Age of Onset    Arthritis Mother     Hyperlipidemia Mother     Arthritis Father     Hyperlipidemia Father     Arthritis Sister     Hyperlipidemia Sister     Sinusitis Daughter     Hyperlipidemia Daughter     Arthritis Maternal Grandmother     Stroke Maternal Grandmother     Prostate cancer Neg Hx     Urolithiasis Neg Hx     Kidney cancer Neg Hx        ALLERGIES AND MEDICATIONS: updated and reviewed.  Review of patient's allergies indicates:   Allergen Reactions    Doxycycline      Other reaction(s): Rash  Other reaction(s): Rash    Phenytoin sodium extended      Other reaction(s): Bright light    Sulfa (sulfonamide antibiotics)      Current Outpatient Medications   Medication Sig Dispense Refill    atorvastatin (LIPITOR) 40 MG tablet Take 1 tablet (40 mg total) by mouth once daily. 30 tablet 11    omeprazole (PRILOSEC) 40 MG capsule Take 1 capsule (40 mg total) by  mouth every morning. 30 capsule 11    aspirin (ECOTRIN) 81 MG EC tablet Take 1 tablet (81 mg total) by mouth once daily.  0    losartan-hydrochlorothiazide 100-12.5 mg (HYZAAR) 100-12.5 mg Tab Take 1 tablet by mouth once daily. 30 tablet 11     No current facility-administered medications for this visit.        I have reviewed the patient's medical history in detail and updated the computerized patient record.    Review of Systems   Constitutional: Positive for fatigue. Negative for activity change, appetite change, chills, fever and unexpected weight change.        Difficulty staying asleep at night. Some daytime fatigue. Patient has been approved for a sleep study by [employer] and needs to call and schedule.    HENT: Negative for congestion, ear pain, hearing loss, postnasal drip, rhinorrhea, sinus pressure, sinus pain, sore throat and trouble swallowing.    Eyes: Negative for pain and visual disturbance.   Respiratory: Negative for apnea, cough, chest tightness, shortness of breath and wheezing.    Cardiovascular: Negative for chest pain, palpitations and leg swelling.   Gastrointestinal: Negative for abdominal pain, constipation, diarrhea, nausea and vomiting.   Genitourinary: Negative for difficulty urinating, dysuria and urgency.   Musculoskeletal: Negative for arthralgias and myalgias.   Skin: Negative for rash and wound.   Neurological: Negative for dizziness, speech difficulty, weakness, light-headedness, numbness and headaches.   Psychiatric/Behavioral: Positive for sleep disturbance. Negative for dysphoric mood and suicidal ideas. The patient is not nervous/anxious.         Patient with some difficulty staying asleep.          Objective:      Vitals:    01/25/19 0754 01/25/19 0757 01/25/19 0832   BP: (!) 146/86 (!) 146/92 (!) 140/90   BP Location: Right arm Left arm Right arm   Patient Position: Sitting Sitting Sitting   BP Method: Large (Automatic)  X-Large (Manual)   Pulse: 72     Temp: 98.2 °F  "(36.8 °C)     TempSrc: Oral     Weight: 117.9 kg (260 lb)     Height: 6' 2" (1.88 m)       Physical Exam   Constitutional: He is oriented to person, place, and time. Vital signs are normal. He appears well-developed. No distress.   Blood pressure with repeat measure at end of visit 140/90.    HENT:   Head: Normocephalic and atraumatic.   Right Ear: Tympanic membrane, external ear and ear canal normal.   Left Ear: Tympanic membrane, external ear and ear canal normal.   Nose: Nose normal. No mucosal edema.   Mouth/Throat: Oropharynx is clear and moist and mucous membranes are normal. No oropharyngeal exudate.   Eyes: Conjunctivae and lids are normal. Pupils are equal, round, and reactive to light. Right eye exhibits no discharge. Left eye exhibits no discharge. Right conjunctiva is not injected. Left conjunctiva is not injected.   Neck: Normal range of motion. Neck supple. Normal carotid pulses present. Carotid bruit is not present.   Cardiovascular: Normal rate, regular rhythm, S1 normal, S2 normal, normal heart sounds, intact distal pulses and normal pulses.   No murmur heard.  Pulses:       Carotid pulses are 2+ on the right side, and 2+ on the left side.       Radial pulses are 2+ on the right side, and 2+ on the left side.        Posterior tibial pulses are 2+ on the right side, and 2+ on the left side.   No edema   Pulmonary/Chest: Effort normal and breath sounds normal. No respiratory distress. He has no decreased breath sounds. He has no wheezes. He has no rhonchi. He has no rales.   Abdominal: Soft. Normal appearance, normal aorta and bowel sounds are normal. He exhibits no distension, no abdominal bruit, no pulsatile midline mass and no mass. There is no hepatosplenomegaly. There is no tenderness. No hernia.   Musculoskeletal: Normal range of motion. He exhibits no edema, tenderness or deformity.   Lymphadenopathy:        Head (right side): No submental, no submandibular, no tonsillar, no preauricular, no " posterior auricular and no occipital adenopathy present.        Head (left side): No submental, no submandibular, no tonsillar, no preauricular, no posterior auricular and no occipital adenopathy present.     He has no cervical adenopathy.        Right: No supraclavicular adenopathy present.        Left: No supraclavicular adenopathy present.   Neurological: He is alert and oriented to person, place, and time. He has normal strength.   Skin: Skin is warm, dry and intact. No rash noted. He is not diaphoretic. No erythema. No pallor.        Psychiatric: He has a normal mood and affect. His speech is normal and behavior is normal. Judgment and thought content normal. His mood appears not anxious. Cognition and memory are normal. He does not exhibit a depressed mood.   Nursing note and vitals reviewed.        Assessment:       1. Annual physical exam    2. Essential hypertension    3. Hyperlipidemia, unspecified hyperlipidemia type    4. Primary adenocarcinoma of prostate    5. Gastroesophageal reflux disease without esophagitis    6. BMI 33.0-33.9,adult    7. High risk medication use          Plan:       Rodo was seen today for annual exam.    Diagnoses and all orders for this visit:    Annual physical exam    Discussed healthy diet, regular exercise, necessary labs, age appropriate cancer screening, and routine vaccinations.  Patient declines flu and pneumonia immunizations today.   Educational handouts provided. Prevention guidelines men age 50-64  Essential hypertension  -     losartan-hydrochlorothiazide 100-12.5 mg (HYZAAR) 100-12.5 mg Tab; Take 1 tablet by mouth once daily, increased dose.  -     Comprehensive metabolic panel; Future  -     aspirin (ECOTRIN) 81 MG EC tablet; Take 1 tablet (81 mg total) by mouth once daily.  -     Aspirin guide calculator completed for patient and benefit for low dose aspirin outweighs risk and patient is willing to start on OTC enteric coated 81 mg aspirin daily   -     Initial  blood pressure readings 146/86, 146/92, repeated manual at end of visit 140/90   -     Losartan HCT dose increased, plan 2 week nurse blood pressure check  Hyperlipidemia, unspecified hyperlipidemia type  -     atorvastatin (LIPITOR) 40 MG tablet; Take 1 tablet (40 mg total) by mouth once daily.  -     Lipid panel; Future  -     Comprehensive metabolic panel; Future  -     aspirin (ECOTRIN) 81 MG EC tablet; Take 1 tablet (81 mg total) by mouth once daily.  -       Lab Results   Component Value Date    CHOL 178 10/20/2017    CHOL 203 (H) 10/17/2016    CHOL 169 10/23/2015     Lab Results   Component Value Date    HDL 67 10/20/2017    HDL 67 10/17/2016    HDL 63 10/23/2015     Lab Results   Component Value Date    LDLCALC 93.8 10/20/2017    LDLCALC 115.4 10/17/2016    LDLCALC 96.2 10/23/2015     Lab Results   Component Value Date    TRIG 86 10/20/2017    TRIG 103 10/17/2016    TRIG 49 10/23/2015     Lab Results   Component Value Date    CHOLHDL 37.6 10/20/2017    CHOLHDL 33.0 10/17/2016    CHOLHDL 37.3 10/23/2015       Primary adenocarcinoma of prostate  -     Prostate Specific Antigen, Diagnostic; Future  -     Last PSA on 10/20/17 was 0.06  -     Instructed to schedule yearly follow up with urology in April 2019    Gastroesophageal reflux disease without esophagitis  -     omeprazole (PRILOSEC) 40 MG capsule; Take 1 capsule (40 mg total) by mouth every morning  -     Symptoms adequately controlled with current medication, continue as is    BMI 33.0-33.9,adult   BMI 33.38 and weight is unchanged at 260 pounds from last visit 8/8/18   Weight loss recommended with well balanced diet and portion controlled meals and increased activity.   High risk medication use  -     Comprehensive metabolic panel; Future    Follow-up in about 1 year (around 1/25/2020) for 1 year annual, 2 week nurse BP check.      Patient readiness: acceptance and barriers:none and readiness    During the course of the visit the patient was educated  and counseled about the following:     Hypertension:   Medication: increase to losartan 100/12.5 mg daily.  Dietary sodium restriction.  Regular aerobic exercise.  Follow up: 2 weeks and as needed.  Obesity:   General weight loss/lifestyle modification strategies discussed (elicit support from others; identify saboteurs; non-food rewards, etc).  Informal exercise measures discussed, e.g. taking stairs instead of elevator.  Regular aerobic exercise program discussed.    Goals: Hypertension: Reduce Blood Pressure and Obesity: Reduce calorie intake and BMI    Did patient meet goals/outcomes: No    The following self management tools provided: declined    Patient Instructions (the written plan) was given to the patient/family.     Time spent with patient: 30 minutes    Barriers to medications present (no )    Adverse reactions to current medications (no)    Over the counter medications reviewed (Yes)

## 2019-02-05 ENCOUNTER — OFFICE VISIT (OUTPATIENT)
Dept: FAMILY MEDICINE | Facility: CLINIC | Age: 59
End: 2019-02-05
Payer: COMMERCIAL

## 2019-02-05 VITALS
DIASTOLIC BLOOD PRESSURE: 79 MMHG | WEIGHT: 245.56 LBS | HEART RATE: 67 BPM | OXYGEN SATURATION: 96 % | TEMPERATURE: 99 F | SYSTOLIC BLOOD PRESSURE: 127 MMHG | HEIGHT: 74 IN | BODY MASS INDEX: 31.51 KG/M2

## 2019-02-05 DIAGNOSIS — J00 ACUTE NASOPHARYNGITIS: Primary | ICD-10-CM

## 2019-02-05 DIAGNOSIS — I10 ESSENTIAL HYPERTENSION: ICD-10-CM

## 2019-02-05 PROCEDURE — 3078F PR MOST RECENT DIASTOLIC BLOOD PRESSURE < 80 MM HG: ICD-10-PCS | Mod: CPTII,S$GLB,, | Performed by: NURSE PRACTITIONER

## 2019-02-05 PROCEDURE — 3008F PR BODY MASS INDEX (BMI) DOCUMENTED: ICD-10-PCS | Mod: CPTII,S$GLB,, | Performed by: NURSE PRACTITIONER

## 2019-02-05 PROCEDURE — 3078F DIAST BP <80 MM HG: CPT | Mod: CPTII,S$GLB,, | Performed by: NURSE PRACTITIONER

## 2019-02-05 PROCEDURE — 99999 PR PBB SHADOW E&M-EST. PATIENT-LVL III: CPT | Mod: PBBFAC,,, | Performed by: NURSE PRACTITIONER

## 2019-02-05 PROCEDURE — 3074F SYST BP LT 130 MM HG: CPT | Mod: CPTII,S$GLB,, | Performed by: NURSE PRACTITIONER

## 2019-02-05 PROCEDURE — 99213 PR OFFICE/OUTPT VISIT, EST, LEVL III, 20-29 MIN: ICD-10-PCS | Mod: S$GLB,,, | Performed by: NURSE PRACTITIONER

## 2019-02-05 PROCEDURE — 99999 PR PBB SHADOW E&M-EST. PATIENT-LVL III: ICD-10-PCS | Mod: PBBFAC,,, | Performed by: NURSE PRACTITIONER

## 2019-02-05 PROCEDURE — 3008F BODY MASS INDEX DOCD: CPT | Mod: CPTII,S$GLB,, | Performed by: NURSE PRACTITIONER

## 2019-02-05 PROCEDURE — 99213 OFFICE O/P EST LOW 20 MIN: CPT | Mod: S$GLB,,, | Performed by: NURSE PRACTITIONER

## 2019-02-05 PROCEDURE — 3074F PR MOST RECENT SYSTOLIC BLOOD PRESSURE < 130 MM HG: ICD-10-PCS | Mod: CPTII,S$GLB,, | Performed by: NURSE PRACTITIONER

## 2019-02-05 RX ORDER — BENZONATATE 100 MG/1
100 CAPSULE ORAL 3 TIMES DAILY PRN
Qty: 30 CAPSULE | Refills: 0 | Status: SHIPPED | OUTPATIENT
Start: 2019-02-05 | End: 2019-02-15

## 2019-02-05 RX ORDER — FLUTICASONE PROPIONATE 50 MCG
1 SPRAY, SUSPENSION (ML) NASAL DAILY
Qty: 1 BOTTLE | Refills: 1 | Status: SHIPPED | OUTPATIENT
Start: 2019-02-05 | End: 2020-01-13

## 2019-02-05 NOTE — PATIENT INSTRUCTIONS
Plain Mucinex twice daily with large glass of water   Flonase to nose each day   Rest, increase fluids and vitamin c

## 2019-02-05 NOTE — PROGRESS NOTES
"Subjective:       Patient ID: Rodo Luna is a 58 y.o. male.    Chief Complaint: Cough    HPI   Mr. Luna is a 57 yo male who presents today with c/o sinus congestion and cough, and "burning" throat.  This problem has been going on for one day.  He has taken tylenol to help with the symptoms, he says this provided him some relief.  He denies fever, chills, and body aches.  His BP today is 127/79, and he is afebrile.     Review of Systems   Constitutional: Negative for chills, fatigue and fever.   HENT: Positive for congestion, postnasal drip, rhinorrhea, sinus pressure and sinus pain.    Eyes: Negative for pain, discharge and itching.   Respiratory: Positive for cough (productive). Negative for chest tightness and shortness of breath.    Cardiovascular: Negative for chest pain and palpitations.   Gastrointestinal: Negative for diarrhea, nausea and vomiting.   Genitourinary: Negative for difficulty urinating and frequency.   Musculoskeletal: Negative for arthralgias and myalgias.   Neurological: Negative for dizziness, light-headedness and headaches.       Objective:      Physical Exam   Constitutional: He is oriented to person, place, and time. He appears well-developed and well-nourished.   HENT:   Head: Normocephalic and atraumatic.   Right Ear: A middle ear effusion is present.   Left Ear: A middle ear effusion is present.   Nose: Mucosal edema present. Right sinus exhibits no maxillary sinus tenderness and no frontal sinus tenderness. Left sinus exhibits no maxillary sinus tenderness and no frontal sinus tenderness.   Mouth/Throat: Posterior oropharyngeal erythema present.   Eyes: Conjunctivae and lids are normal.   Cardiovascular: Normal rate, regular rhythm, S1 normal, S2 normal and normal heart sounds.   Pulmonary/Chest: Effort normal. No respiratory distress. He has no wheezes.   Lymphadenopathy:     He has no cervical adenopathy.   Neurological: He is alert and oriented to person, place, and time. "   Skin: Skin is warm, dry and intact.   Psychiatric: He has a normal mood and affect. His speech is normal.       Assessment:       1. Acute nasopharyngitis    2. Essential hypertension        Plan:       Acute nasopharyngitis  -     fluticasone (FLONASE) 50 mcg/actuation nasal spray; 1 spray (50 mcg total) by Each Nare route once daily.  Dispense: 1 Bottle; Refill: 1  -     benzonatate (TESSALON) 100 MG capsule; Take 1 capsule (100 mg total) by mouth 3 (three) times daily as needed for Cough.  Dispense: 30 capsule; Refill: 0  -     Plain Mucinex twice daily with large glass of water  -     Increase rest, fluids and vitamin C    Essential hypertension  - Stable, continue current regimen    Patient readiness: acceptance and barriers:none    During the course of the visit the patient was educated and counseled about the following:     Hypertension:   Dietary sodium restriction.  Regular aerobic exercise.    Goals: Hypertension: Reduce Blood Pressure    Did patient meet goals/outcomes: Yes    The following self management tools provided: declined    Patient Instructions (the written plan) was given to the patient/family.     Time spent with patient: 15 minutes    Barriers to medications present (no )    Adverse reactions to current medications (no)    Over the counter medications reviewed (Yes)

## 2019-02-05 NOTE — LETTER
February 5, 2019      [facility]  [address]  [place] [address]  Phone: [phone]  Fax: [phone]       Patient: Rodo Luna   YOB: 1960  Date of Visit: 02/05/2019    To Whom It May Concern:    Tyler Luna  was at [facility] on 02/05/2019. He may return to work/school on 2/7/2019 with no restrictions. If you have any questions or concerns, or if I can be of further assistance, please do not hesitate to contact me.    Sincerely,    Daniela Horta NP

## 2019-02-13 ENCOUNTER — TELEPHONE (OUTPATIENT)
Dept: FAMILY MEDICINE | Facility: CLINIC | Age: 59
End: 2019-02-13

## 2019-02-13 NOTE — TELEPHONE ENCOUNTER
----- Message from Arielel Menendez sent at 2/13/2019 10:30 AM CST -----  Contact: pt  Calling in regards to employer lost copy of work release and asking if you can send another copy of the work release to pt on his email address at [email or web address] because he works off [employer] and he is at work now and please advise.

## 2019-03-04 NOTE — TELEPHONE ENCOUNTER
LM patient to call clinic to schedule appt  
Patient informed. Apt booked 1-25-19 with Ms Quintanilla  
See other refill message.  Overdue for checkup and lab  
No

## 2019-03-15 ENCOUNTER — CLINICAL SUPPORT (OUTPATIENT)
Dept: INTERNAL MEDICINE | Facility: CLINIC | Age: 59
End: 2019-03-15
Payer: COMMERCIAL

## 2019-03-15 DIAGNOSIS — Z02.89 ENCOUNTER FOR PHYSICAL EXAMINATION RELATED TO EMPLOYMENT: Primary | ICD-10-CM

## 2019-03-15 PROCEDURE — 99201 PR OFFICE/OUTPT VISIT,NEW,LEVL I: ICD-10-PCS | Mod: ,,, | Performed by: INTERNAL MEDICINE

## 2019-03-15 PROCEDURE — 99201 PR OFFICE/OUTPT VISIT,NEW,LEVL I: CPT | Mod: ,,, | Performed by: INTERNAL MEDICINE

## 2019-04-24 ENCOUNTER — DOCUMENTATION ONLY (OUTPATIENT)
Dept: FAMILY MEDICINE | Facility: CLINIC | Age: 59
End: 2019-04-24

## 2019-04-24 NOTE — PROGRESS NOTES
Pre-Visit Chart Review  For Appointment Scheduled on 5-3-19    Health Maintenance Due   Topic Date Due    Pneumococcal Vaccine (Highest Risk) (1 of 3 - PCV13) 04/15/1980    Influenza Vaccine  08/01/2018

## 2019-05-03 ENCOUNTER — OFFICE VISIT (OUTPATIENT)
Dept: FAMILY MEDICINE | Facility: CLINIC | Age: 59
End: 2019-05-03
Attending: FAMILY MEDICINE
Payer: COMMERCIAL

## 2019-05-03 VITALS
WEIGHT: 264.56 LBS | DIASTOLIC BLOOD PRESSURE: 84 MMHG | OXYGEN SATURATION: 98 % | TEMPERATURE: 98 F | HEART RATE: 59 BPM | BODY MASS INDEX: 33.95 KG/M2 | HEIGHT: 74 IN | RESPIRATION RATE: 24 BRPM | SYSTOLIC BLOOD PRESSURE: 130 MMHG

## 2019-05-03 DIAGNOSIS — G47.00 INSOMNIA, UNSPECIFIED TYPE: Primary | ICD-10-CM

## 2019-05-03 PROCEDURE — 3079F PR MOST RECENT DIASTOLIC BLOOD PRESSURE 80-89 MM HG: ICD-10-PCS | Mod: CPTII,S$GLB,, | Performed by: FAMILY MEDICINE

## 2019-05-03 PROCEDURE — 3079F DIAST BP 80-89 MM HG: CPT | Mod: CPTII,S$GLB,, | Performed by: FAMILY MEDICINE

## 2019-05-03 PROCEDURE — 99999 PR PBB SHADOW E&M-EST. PATIENT-LVL III: CPT | Mod: PBBFAC,,, | Performed by: FAMILY MEDICINE

## 2019-05-03 PROCEDURE — 99212 OFFICE O/P EST SF 10 MIN: CPT | Mod: S$GLB,,, | Performed by: FAMILY MEDICINE

## 2019-05-03 PROCEDURE — 3008F BODY MASS INDEX DOCD: CPT | Mod: CPTII,S$GLB,, | Performed by: FAMILY MEDICINE

## 2019-05-03 PROCEDURE — 99212 PR OFFICE/OUTPT VISIT, EST, LEVL II, 10-19 MIN: ICD-10-PCS | Mod: S$GLB,,, | Performed by: FAMILY MEDICINE

## 2019-05-03 PROCEDURE — 3008F PR BODY MASS INDEX (BMI) DOCUMENTED: ICD-10-PCS | Mod: CPTII,S$GLB,, | Performed by: FAMILY MEDICINE

## 2019-05-03 PROCEDURE — 3075F PR MOST RECENT SYSTOLIC BLOOD PRESS GE 130-139MM HG: ICD-10-PCS | Mod: CPTII,S$GLB,, | Performed by: FAMILY MEDICINE

## 2019-05-03 PROCEDURE — 3075F SYST BP GE 130 - 139MM HG: CPT | Mod: CPTII,S$GLB,, | Performed by: FAMILY MEDICINE

## 2019-05-03 PROCEDURE — 99999 PR PBB SHADOW E&M-EST. PATIENT-LVL III: ICD-10-PCS | Mod: PBBFAC,,, | Performed by: FAMILY MEDICINE

## 2019-05-03 RX ORDER — TRAZODONE HYDROCHLORIDE 50 MG/1
50 TABLET ORAL NIGHTLY PRN
Qty: 30 TABLET | Refills: 5 | Status: SHIPPED | OUTPATIENT
Start: 2019-05-03 | End: 2019-07-25

## 2019-05-03 NOTE — PROGRESS NOTES
Subjective:       Patient ID: Rodo Luna is a 59 y.o. male.    Chief Complaint: Insomnia (times a few months)    59-year-old male with a longstanding history of sleep disturbances.  He tends to have only a light does and awakens easily during the night.  He awakens every day feeling like he has not slept.  He has no history of snoring, waking up choking, or falling asleep during the day.  He is not using any kind of sleep aid.  Several years ago he was given some Paxil around the time he had several family emergencies but he states he never tried it.    Past Medical History:  No date: Allergy  No date: GERD (gastroesophageal reflux disease)  No date: Hematuria  No date: History of brachytherapy  No date: Hyperlipidemia  No date: Hypertension  No date: Primary adenocarcinoma of prostate      Comment:  nik 6, s/p brachytherapy  06/03/2016: URI (upper respiratory infection)      Comment:  hardeep MD  5/13/2013: Varicose veins of leg with swelling  No date: Wears glasses    Past Surgical History:  7/17/2012: BIOPSY, PROSTATE; N/A      Comment:  Performed by Eriberto Valles MD at [facility] OR  10/23/2012: BRACHYTHERAPY; N/A      Comment:  Performed by Eriberto Valles MD at [facility] OR  6/12/12: COLONOSCOPY      Comment:  Dr. Lau 5 year recheck  5/9/2017: COLONOSCOPY; N/A      Comment:  Performed by Louis Lau MD at [facility] ENDO  6/12/2012: COLONOSCOPY; N/A      Comment:  Performed by Louis Lau MD at [facility] ENDO  No date: CYSTOSCOPY  5/12/2015: CYSTOSCOPY; N/A      Comment:  Performed by Eriberto Valles MD at [facility] OR  6/12/2012: EGD (ESOPHAGOGASTRODUODENOSCOPY); N/A      Comment:  Performed by Louis Lau MD at [facility] ENDO  6/1/2012: EXCISION-MASS; Right      Comment:  Performed by Louis Lau MD at [facility] OR  No date: LIPOMA RESECTION      Comment:  RIGHT THIGH  No date: TONSILLECTOMY    Current Outpatient Medications on File Prior to Visit:  atorvastatin (LIPITOR) 40 MG tablet, Take 1 tablet (40 mg total)  by mouth once daily., Disp: 30 tablet, Rfl: 11  fluticasone (FLONASE) 50 mcg/actuation nasal spray, 1 spray (50 mcg total) by Each Nare route once daily., Disp: 1 Bottle, Rfl: 1  losartan-hydrochlorothiazide 100-12.5 mg (HYZAAR) 100-12.5 mg Tab, Take 1 tablet by mouth once daily., Disp: 30 tablet, Rfl: 11  omeprazole (PRILOSEC) 40 MG capsule, Take 1 capsule (40 mg total) by mouth every morning., Disp: 30 capsule, Rfl: 11  (DISCONTINUED) aspirin (ECOTRIN) 81 MG EC tablet, Take 1 tablet (81 mg total) by mouth once daily., Disp: , Rfl: 0    No current facility-administered medications on file prior to visit.         Review of Systems   Constitutional: Negative.    HENT: Negative.    Eyes: Negative.    Respiratory: Negative.    Cardiovascular: Negative.    Gastrointestinal: Negative.    Musculoskeletal: Negative.    Psychiatric/Behavioral: Positive for sleep disturbance. Negative for dysphoric mood. The patient is not nervous/anxious.        Objective:      Physical Exam   Constitutional: He is oriented to person, place, and time.   Good blood pressure control  Obese with a BMI of 33.9 he is unchanged from his last weight at his last visit with me October 14, 2016   Neurological: He is alert and oriented to person, place, and time.   Psychiatric: He has a normal mood and affect. His speech is normal and behavior is normal. Judgment and thought content normal. He is not actively hallucinating. Cognition and memory are normal. He is attentive.   Nursing note and vitals reviewed.      Assessment:       1. Insomnia, unspecified type        Plan:       1. Insomnia, unspecified type  - traZODone (DESYREL) 50 MG tablet; Take 1 tablet (50 mg total) by mouth nightly as needed for Insomnia.  Dispense: 30 tablet; Refill: 5  Start with 1/2 pill at bedtime and increase to a full one if needed

## 2019-05-29 ENCOUNTER — TELEPHONE (OUTPATIENT)
Dept: FAMILY MEDICINE | Facility: CLINIC | Age: 59
End: 2019-05-29

## 2019-05-29 NOTE — TELEPHONE ENCOUNTER
----- Message from Libertad Lagunas sent at 5/29/2019  8:51 AM CDT -----  Contact: self  Patient requesting to speak to nurse regarding medication that was prescribed      Please call to advice [phone] (home) [phone] (work)

## 2019-05-29 NOTE — TELEPHONE ENCOUNTER
Spoke to patient. Patient states the trazodone is not working for him patient states he has tried half a tab and 1 whole tab. Patient states he is still having trouble staying asleep

## 2019-05-29 NOTE — TELEPHONE ENCOUNTER
He can try taking two full tablets at a time for a 100 mg dose.  I will not use any habit-forming agents.

## 2019-07-05 ENCOUNTER — TELEPHONE (OUTPATIENT)
Dept: UROLOGY | Facility: CLINIC | Age: 59
End: 2019-07-05

## 2019-07-05 NOTE — TELEPHONE ENCOUNTER
----- Message from Melvin Sethi sent at 7/5/2019  3:03 PM CDT -----  Contact: same  Patient called in and stated she missed a call back from office & the only information patient would give is that he needs to ask a question.  Patient call back number is [phone]

## 2019-07-05 NOTE — TELEPHONE ENCOUNTER
----- Message from Scar Pichardo sent at 7/5/2019  8:55 AM CDT -----  Contact: self   Patient need to speak with a nurse regarding a second opinion he is needing for his neighbor. Please call back at [phone] (home) [phone] (work)

## 2019-07-05 NOTE — TELEPHONE ENCOUNTER
Pt states he wanted his neighbor to get a second  Urology opinion ans wanted to see someone in [place]. Provided pt with scheduling's number. PT verbalized understanding.

## 2019-07-23 ENCOUNTER — DOCUMENTATION ONLY (OUTPATIENT)
Dept: FAMILY MEDICINE | Facility: CLINIC | Age: 59
End: 2019-07-23

## 2019-07-23 NOTE — PROGRESS NOTES
Pre-Visit Chart Review  For Appointment Scheduled on 7-25-19    Health Maintenance Due   Topic Date Due    Pneumococcal Vaccine (Highest Risk) (1 of 3 - PCV13) 04/15/1980

## 2019-07-25 ENCOUNTER — OFFICE VISIT (OUTPATIENT)
Dept: FAMILY MEDICINE | Facility: CLINIC | Age: 59
End: 2019-07-25
Attending: FAMILY MEDICINE
Payer: COMMERCIAL

## 2019-07-25 ENCOUNTER — DOCUMENTATION ONLY (OUTPATIENT)
Dept: FAMILY MEDICINE | Facility: CLINIC | Age: 59
End: 2019-07-25

## 2019-07-25 VITALS
WEIGHT: 263 LBS | BODY MASS INDEX: 33.75 KG/M2 | HEART RATE: 55 BPM | HEIGHT: 74 IN | OXYGEN SATURATION: 98 % | RESPIRATION RATE: 16 BRPM | TEMPERATURE: 98 F | SYSTOLIC BLOOD PRESSURE: 120 MMHG | DIASTOLIC BLOOD PRESSURE: 76 MMHG

## 2019-07-25 DIAGNOSIS — I10 ESSENTIAL HYPERTENSION: ICD-10-CM

## 2019-07-25 DIAGNOSIS — F32.A ANXIETY AND DEPRESSION: Primary | ICD-10-CM

## 2019-07-25 DIAGNOSIS — F41.9 ANXIETY AND DEPRESSION: Primary | ICD-10-CM

## 2019-07-25 PROCEDURE — 99999 PR PBB SHADOW E&M-EST. PATIENT-LVL IV: CPT | Mod: PBBFAC,,, | Performed by: FAMILY MEDICINE

## 2019-07-25 PROCEDURE — 3078F PR MOST RECENT DIASTOLIC BLOOD PRESSURE < 80 MM HG: ICD-10-PCS | Mod: CPTII,S$GLB,, | Performed by: FAMILY MEDICINE

## 2019-07-25 PROCEDURE — 99999 PR PBB SHADOW E&M-EST. PATIENT-LVL IV: ICD-10-PCS | Mod: PBBFAC,,, | Performed by: FAMILY MEDICINE

## 2019-07-25 PROCEDURE — 3074F PR MOST RECENT SYSTOLIC BLOOD PRESSURE < 130 MM HG: ICD-10-PCS | Mod: CPTII,S$GLB,, | Performed by: FAMILY MEDICINE

## 2019-07-25 PROCEDURE — 3074F SYST BP LT 130 MM HG: CPT | Mod: CPTII,S$GLB,, | Performed by: FAMILY MEDICINE

## 2019-07-25 PROCEDURE — 99213 PR OFFICE/OUTPT VISIT, EST, LEVL III, 20-29 MIN: ICD-10-PCS | Mod: S$GLB,,, | Performed by: FAMILY MEDICINE

## 2019-07-25 PROCEDURE — 3078F DIAST BP <80 MM HG: CPT | Mod: CPTII,S$GLB,, | Performed by: FAMILY MEDICINE

## 2019-07-25 PROCEDURE — 3008F PR BODY MASS INDEX (BMI) DOCUMENTED: ICD-10-PCS | Mod: CPTII,S$GLB,, | Performed by: FAMILY MEDICINE

## 2019-07-25 PROCEDURE — 99213 OFFICE O/P EST LOW 20 MIN: CPT | Mod: S$GLB,,, | Performed by: FAMILY MEDICINE

## 2019-07-25 PROCEDURE — 3008F BODY MASS INDEX DOCD: CPT | Mod: CPTII,S$GLB,, | Performed by: FAMILY MEDICINE

## 2019-07-25 RX ORDER — ESCITALOPRAM OXALATE 10 MG/1
10 TABLET ORAL DAILY
Qty: 30 TABLET | Refills: 5 | Status: SHIPPED | OUTPATIENT
Start: 2019-07-25 | End: 2020-01-08 | Stop reason: SDDI

## 2019-07-25 NOTE — PROGRESS NOTES
Subjective:       Patient ID: Rodo Luna is a 59 y.o. male.    Chief Complaint: Fatigue    59-year-old male coming in with complaints of decreased energy, irritability, some sleep disturbances with delayed onset of sleep and mind racing.  He has lost interest in typically enjoyable activities such as keeping up his yd and has become quite apathetic.  His stress levels generally have been higher and he has been noticing symptoms for about nine months but said it may have been going on as much as a year.  He has not had a strong affect on appetite and he has no suicidal thoughts or ideation.    Past Medical History:  No date: Allergy  No date: GERD (gastroesophageal reflux disease)  No date: Hematuria  No date: History of brachytherapy  No date: Hyperlipidemia  No date: Hypertension  No date: Primary adenocarcinoma of prostate      Comment:  nik 6, s/p brachytherapy  06/03/2016: URI (upper respiratory infection)      Comment:  armen GARZA  5/13/2013: Varicose veins of leg with swelling  No date: Wears glasses    Past Surgical History:  7/17/2012: BIOPSY, PROSTATE; N/A      Comment:  Performed by Eriberto Valles MD at [facility] OR  10/23/2012: BRACHYTHERAPY; N/A      Comment:  Performed by Eriberto Valles MD at [facility] OR  6/12/12: COLONOSCOPY      Comment:  Dr. Lau 5 year recheck  5/9/2017: COLONOSCOPY; N/A      Comment:  Performed by Louis Lau MD at [facility] ENDO  6/12/2012: COLONOSCOPY; N/A      Comment:  Performed by Louis Lau MD at [facility] ENDO  No date: CYSTOSCOPY  5/12/2015: CYSTOSCOPY; N/A      Comment:  Performed by Eriberto Valles MD at [facility] OR  6/12/2012: EGD (ESOPHAGOGASTRODUODENOSCOPY); N/A      Comment:  Performed by Louis Lau MD at [facility] ENDO  6/1/2012: EXCISION-MASS; Right      Comment:  Performed by Louis Lau MD at [facility] OR  No date: LIPOMA RESECTION      Comment:  RIGHT THIGH  No date: TONSILLECTOMY    Current Outpatient Medications on File Prior to Visit:  atorvastatin (LIPITOR) 40  MG tablet, Take 1 tablet (40 mg total) by mouth once daily., Disp: 30 tablet, Rfl: 11  fluticasone (FLONASE) 50 mcg/actuation nasal spray, 1 spray (50 mcg total) by Each Nare route once daily., Disp: 1 Bottle, Rfl: 1  losartan-hydrochlorothiazide 100-12.5 mg (HYZAAR) 100-12.5 mg Tab, Take 1 tablet by mouth once daily., Disp: 30 tablet, Rfl: 11  omeprazole (PRILOSEC) 40 MG capsule, Take 1 capsule (40 mg total) by mouth every morning., Disp: 30 capsule, Rfl: 11  (DISCONTINUED) traZODone (DESYREL) 50 MG tablet, Take 1 tablet (50 mg total) by mouth nightly as needed for Insomnia., Disp: 30 tablet, Rfl: 5    No current facility-administered medications on file prior to visit.         Review of Systems   Constitutional: Positive for fatigue.   Psychiatric/Behavioral: Positive for agitation (Irritability), dysphoric mood and sleep disturbance. The patient is nervous/anxious.        Objective:      Physical Exam   Constitutional: He appears well-developed. No distress.   Good blood pressure control  Obese with a BMI of 33.8 he is down 1.5 lb from his May 3, 2019 visit   Skin: He is not diaphoretic.   Psychiatric: His speech is normal and behavior is normal. Judgment and thought content normal. His mood appears not anxious. His affect is not angry, not blunt, not labile and not inappropriate. Cognition and memory are normal. He exhibits a depressed mood (Mildly).   Mildly flattened affect   Nursing note and vitals reviewed.      Assessment:       1. Anxiety and depression    2. Essential hypertension        Plan:       1. Anxiety and depression  Discussed options including medication and/or counseling.  Discussed potential side effects of medications and usable side effects such as sedation for a patient having sleep disturbances.  Compromised on low-dose Lexapro as he is very concerned about having adverse effects and does have significant anxiety symptoms as well as mild depression  - escitalopram oxalate (LEXAPRO) 10 MG  tablet; Take 1 tablet (10 mg total) by mouth once daily.  Dispense: 30 tablet; Refill: 5  Recheck four weeks    2. Essential hypertension  Good control with no changes needed

## 2019-07-25 NOTE — PATIENT INSTRUCTIONS

## 2019-07-25 NOTE — PROGRESS NOTES
Pre-Visit Chart Review  For Appointment Scheduled on 8-22-19    Health Maintenance Due   Topic Date Due    Pneumococcal Vaccine (Highest Risk) (1 of 3 - PCV13) 04/15/1980

## 2019-08-09 ENCOUNTER — PATIENT OUTREACH (OUTPATIENT)
Dept: ADMINISTRATIVE | Facility: HOSPITAL | Age: 59
End: 2019-08-09

## 2019-11-19 DIAGNOSIS — I10 ESSENTIAL HYPERTENSION: ICD-10-CM

## 2019-11-19 DIAGNOSIS — E78.5 HYPERLIPIDEMIA, UNSPECIFIED HYPERLIPIDEMIA TYPE: ICD-10-CM

## 2019-11-19 RX ORDER — ATORVASTATIN CALCIUM 40 MG/1
TABLET, FILM COATED ORAL
Qty: 30 TABLET | Refills: 5 | Status: SHIPPED | OUTPATIENT
Start: 2019-11-19 | End: 2020-01-15 | Stop reason: DRUGHIGH

## 2019-11-19 RX ORDER — LOSARTAN POTASSIUM AND HYDROCHLOROTHIAZIDE 12.5; 1 MG/1; MG/1
TABLET ORAL
Qty: 30 TABLET | Refills: 5 | Status: SHIPPED | OUTPATIENT
Start: 2019-11-19 | End: 2020-04-14

## 2019-11-20 NOTE — TELEPHONE ENCOUNTER
Prescriptions filled. Patient will be due for his annual exam and blood work in January. Please schedule his appointment.   Thanks.   Chasity

## 2020-01-06 ENCOUNTER — DOCUMENTATION ONLY (OUTPATIENT)
Dept: FAMILY MEDICINE | Facility: CLINIC | Age: 60
End: 2020-01-06

## 2020-01-06 NOTE — PROGRESS NOTES
Pre-Visit Chart Review  For Appointment Scheduled on 1-8-20    Health Maintenance Due   Topic Date Due    Pneumococcal Vaccine (Highest Risk) (1 of 3 - PCV13) 04/15/1980

## 2020-01-08 ENCOUNTER — OFFICE VISIT (OUTPATIENT)
Dept: FAMILY MEDICINE | Facility: CLINIC | Age: 60
End: 2020-01-08
Attending: FAMILY MEDICINE
Payer: COMMERCIAL

## 2020-01-08 ENCOUNTER — LAB VISIT (OUTPATIENT)
Dept: LAB | Facility: HOSPITAL | Age: 60
End: 2020-01-08
Attending: FAMILY MEDICINE
Payer: COMMERCIAL

## 2020-01-08 VITALS
TEMPERATURE: 98 F | BODY MASS INDEX: 33.92 KG/M2 | RESPIRATION RATE: 16 BRPM | HEART RATE: 53 BPM | HEIGHT: 74 IN | DIASTOLIC BLOOD PRESSURE: 92 MMHG | SYSTOLIC BLOOD PRESSURE: 152 MMHG | OXYGEN SATURATION: 98 % | WEIGHT: 264.31 LBS

## 2020-01-08 DIAGNOSIS — C61 PROSTATE CANCER: ICD-10-CM

## 2020-01-08 DIAGNOSIS — F32.A ANXIETY AND DEPRESSION: Primary | ICD-10-CM

## 2020-01-08 DIAGNOSIS — R97.20 ABNORMAL PSA: ICD-10-CM

## 2020-01-08 DIAGNOSIS — E78.5 HYPERLIPIDEMIA, UNSPECIFIED HYPERLIPIDEMIA TYPE: ICD-10-CM

## 2020-01-08 DIAGNOSIS — Z91.89 PNEUMOCOCCAL VACCINATION INDICATED: ICD-10-CM

## 2020-01-08 DIAGNOSIS — F41.9 ANXIETY AND DEPRESSION: Primary | ICD-10-CM

## 2020-01-08 DIAGNOSIS — I10 ESSENTIAL HYPERTENSION: ICD-10-CM

## 2020-01-08 LAB
ALBUMIN SERPL BCP-MCNC: 4.5 G/DL (ref 3.5–5.2)
ALP SERPL-CCNC: 68 U/L (ref 55–135)
ALT SERPL W/O P-5'-P-CCNC: 21 U/L (ref 10–44)
ANION GAP SERPL CALC-SCNC: 14 MMOL/L (ref 8–16)
AST SERPL-CCNC: 19 U/L (ref 10–40)
BASOPHILS # BLD AUTO: 0.11 K/UL (ref 0–0.2)
BASOPHILS NFR BLD: 1.4 % (ref 0–1.9)
BILIRUB SERPL-MCNC: 0.8 MG/DL (ref 0.1–1)
BUN SERPL-MCNC: 11 MG/DL (ref 6–20)
CALCIUM SERPL-MCNC: 10.1 MG/DL (ref 8.7–10.5)
CHLORIDE SERPL-SCNC: 103 MMOL/L (ref 95–110)
CHOLEST SERPL-MCNC: 194 MG/DL (ref 120–199)
CHOLEST/HDLC SERPL: 3 {RATIO} (ref 2–5)
CK SERPL-CCNC: 104 U/L (ref 20–200)
CO2 SERPL-SCNC: 23 MMOL/L (ref 23–29)
CREAT SERPL-MCNC: 1 MG/DL (ref 0.5–1.4)
DIFFERENTIAL METHOD: NORMAL
EOSINOPHIL # BLD AUTO: 0.1 K/UL (ref 0–0.5)
EOSINOPHIL NFR BLD: 0.7 % (ref 0–8)
ERYTHROCYTE [DISTWIDTH] IN BLOOD BY AUTOMATED COUNT: 13.2 % (ref 11.5–14.5)
EST. GFR  (AFRICAN AMERICAN): >60 ML/MIN/1.73 M^2
EST. GFR  (NON AFRICAN AMERICAN): >60 ML/MIN/1.73 M^2
GLUCOSE SERPL-MCNC: 83 MG/DL (ref 70–110)
HCT VFR BLD AUTO: 48.7 % (ref 40–54)
HDLC SERPL-MCNC: 65 MG/DL (ref 40–75)
HDLC SERPL: 33.5 % (ref 20–50)
HGB BLD-MCNC: 16.2 G/DL (ref 14–18)
IMM GRANULOCYTES # BLD AUTO: 0.03 K/UL (ref 0–0.04)
IMM GRANULOCYTES NFR BLD AUTO: 0.4 % (ref 0–0.5)
LDLC SERPL CALC-MCNC: 114.4 MG/DL (ref 63–159)
LYMPHOCYTES # BLD AUTO: 2 K/UL (ref 1–4.8)
LYMPHOCYTES NFR BLD: 24.1 % (ref 18–48)
MCH RBC QN AUTO: 30.1 PG (ref 27–31)
MCHC RBC AUTO-ENTMCNC: 33.3 G/DL (ref 32–36)
MCV RBC AUTO: 91 FL (ref 82–98)
MONOCYTES # BLD AUTO: 0.9 K/UL (ref 0.3–1)
MONOCYTES NFR BLD: 10.9 % (ref 4–15)
NEUTROPHILS # BLD AUTO: 5.1 K/UL (ref 1.8–7.7)
NEUTROPHILS NFR BLD: 62.5 % (ref 38–73)
NONHDLC SERPL-MCNC: 129 MG/DL
NRBC BLD-RTO: 0 /100 WBC
PLATELET # BLD AUTO: 199 K/UL (ref 150–350)
PMV BLD AUTO: 11.7 FL (ref 9.2–12.9)
POTASSIUM SERPL-SCNC: 3.5 MMOL/L (ref 3.5–5.1)
PROT SERPL-MCNC: 8.2 G/DL (ref 6–8.4)
RBC # BLD AUTO: 5.38 M/UL (ref 4.6–6.2)
SODIUM SERPL-SCNC: 140 MMOL/L (ref 136–145)
TRIGL SERPL-MCNC: 73 MG/DL (ref 30–150)
WBC # BLD AUTO: 8.09 K/UL (ref 3.9–12.7)

## 2020-01-08 PROCEDURE — 99999 PR PBB SHADOW E&M-EST. PATIENT-LVL IV: ICD-10-PCS | Mod: PBBFAC,,, | Performed by: FAMILY MEDICINE

## 2020-01-08 PROCEDURE — 3080F DIAST BP >= 90 MM HG: CPT | Mod: CPTII,S$GLB,, | Performed by: FAMILY MEDICINE

## 2020-01-08 PROCEDURE — 99213 PR OFFICE/OUTPT VISIT, EST, LEVL III, 20-29 MIN: ICD-10-PCS | Mod: 25,S$GLB,, | Performed by: FAMILY MEDICINE

## 2020-01-08 PROCEDURE — 3077F PR MOST RECENT SYSTOLIC BLOOD PRESSURE >= 140 MM HG: ICD-10-PCS | Mod: CPTII,S$GLB,, | Performed by: FAMILY MEDICINE

## 2020-01-08 PROCEDURE — 90472 IMMUNIZATION ADMIN EACH ADD: CPT | Mod: S$GLB,,, | Performed by: FAMILY MEDICINE

## 2020-01-08 PROCEDURE — 90670 PCV13 VACCINE IM: CPT | Mod: S$GLB,,, | Performed by: FAMILY MEDICINE

## 2020-01-08 PROCEDURE — 80061 LIPID PANEL: CPT

## 2020-01-08 PROCEDURE — 90686 IIV4 VACC NO PRSV 0.5 ML IM: CPT | Mod: S$GLB,,, | Performed by: FAMILY MEDICINE

## 2020-01-08 PROCEDURE — 3008F PR BODY MASS INDEX (BMI) DOCUMENTED: ICD-10-PCS | Mod: CPTII,S$GLB,, | Performed by: FAMILY MEDICINE

## 2020-01-08 PROCEDURE — 3008F BODY MASS INDEX DOCD: CPT | Mod: CPTII,S$GLB,, | Performed by: FAMILY MEDICINE

## 2020-01-08 PROCEDURE — 90686 FLU VACCINE (QUAD) GREATER THAN OR EQUAL TO 3YO PRESERVATIVE FREE IM: ICD-10-PCS | Mod: S$GLB,,, | Performed by: FAMILY MEDICINE

## 2020-01-08 PROCEDURE — 99213 OFFICE O/P EST LOW 20 MIN: CPT | Mod: 25,S$GLB,, | Performed by: FAMILY MEDICINE

## 2020-01-08 PROCEDURE — 85025 COMPLETE CBC W/AUTO DIFF WBC: CPT

## 2020-01-08 PROCEDURE — 90471 FLU VACCINE (QUAD) GREATER THAN OR EQUAL TO 3YO PRESERVATIVE FREE IM: ICD-10-PCS | Mod: S$GLB,,, | Performed by: FAMILY MEDICINE

## 2020-01-08 PROCEDURE — 82550 ASSAY OF CK (CPK): CPT

## 2020-01-08 PROCEDURE — 36415 COLL VENOUS BLD VENIPUNCTURE: CPT | Mod: PO

## 2020-01-08 PROCEDURE — 84153 ASSAY OF PSA TOTAL: CPT

## 2020-01-08 PROCEDURE — 3080F PR MOST RECENT DIASTOLIC BLOOD PRESSURE >= 90 MM HG: ICD-10-PCS | Mod: CPTII,S$GLB,, | Performed by: FAMILY MEDICINE

## 2020-01-08 PROCEDURE — 3077F SYST BP >= 140 MM HG: CPT | Mod: CPTII,S$GLB,, | Performed by: FAMILY MEDICINE

## 2020-01-08 PROCEDURE — 90670 PNEUMOCOCCAL CONJUGATE VACCINE 13-VALENT LESS THAN 5YO & GREATER THAN: ICD-10-PCS | Mod: S$GLB,,, | Performed by: FAMILY MEDICINE

## 2020-01-08 PROCEDURE — 99999 PR PBB SHADOW E&M-EST. PATIENT-LVL IV: CPT | Mod: PBBFAC,,, | Performed by: FAMILY MEDICINE

## 2020-01-08 PROCEDURE — 90472 PNEUMOCOCCAL CONJUGATE VACCINE 13-VALENT LESS THAN 5YO & GREATER THAN: ICD-10-PCS | Mod: S$GLB,,, | Performed by: FAMILY MEDICINE

## 2020-01-08 PROCEDURE — 90471 IMMUNIZATION ADMIN: CPT | Mod: S$GLB,,, | Performed by: FAMILY MEDICINE

## 2020-01-08 PROCEDURE — 80053 COMPREHEN METABOLIC PANEL: CPT

## 2020-01-08 RX ORDER — ESCITALOPRAM OXALATE 10 MG/1
10 TABLET ORAL DAILY
Qty: 30 TABLET | Refills: 3 | Status: SHIPPED | OUTPATIENT
Start: 2020-01-08 | End: 2020-03-19

## 2020-01-08 NOTE — PROGRESS NOTES
Subjective:       Patient ID: Rodo Luna is a 59 y.o. male.    Chief Complaint: Anxiety and Depression    59-year-old male coming in again with complaints of anxiety and depression and poor quality sleep.  He was seen in July for the same problem and was given Lexapro but he read the product safety data and for was too afraid to try it.  He comes in accompanied by his sister who is encouraging him to seek help.  Symptoms are assist unchanged from the previous visit and he is willing to try the Lexapro this time.  As before were going to use low-dose and try and use the safest agent with the least side effects possible.    Past Medical History:  No date: Allergy  No date: GERD (gastroesophageal reflux disease)  No date: Hematuria  No date: History of brachytherapy  No date: Hyperlipidemia  No date: Hypertension  No date: Primary adenocarcinoma of prostate      Comment:  nik 6, s/p brachytherapy  06/03/2016: URI (upper respiratory infection)      Comment:  [place] MD  5/13/2013: Varicose veins of leg with swelling  No date: Wears glasses    Past Surgical History:  6/12/12: COLONOSCOPY      Comment:  Dr. Lau 5 year recheck  5/9/2017: COLONOSCOPY; N/A      Comment:  Procedure: COLONOSCOPY;  Surgeon: Louis Lau MD;                 Location: [facility];  Service: Endoscopy;  Laterality:                N/A;  personal history of colon polyps  family history of               colon cancer  No date: CYSTOSCOPY  No date: LIPOMA RESECTION      Comment:  RIGHT THIGH  No date: TONSILLECTOMY    Current Outpatient Medications on File Prior to Visit:  atorvastatin (LIPITOR) 40 MG tablet, TAKE 1 TABLET BY MOUTH EVERY DAY, Disp: 30 tablet, Rfl: 5  fluticasone (FLONASE) 50 mcg/actuation nasal spray, 1 spray (50 mcg total) by Each Nare route once daily. (Patient taking differently: 1 spray by Each Nostril route daily as needed. ), Disp: 1 Bottle, Rfl: 1  losartan-hydrochlorothiazide 100-12.5 mg (HYZAAR) 100-12.5 mg Tab, TAKE  1 TABLET BY MOUTH EVERY DAY, Disp: 30 tablet, Rfl: 5  omeprazole (PRILOSEC) 40 MG capsule, Take 1 capsule (40 mg total) by mouth every morning., Disp: 30 capsule, Rfl: 11  (DISCONTINUED) escitalopram oxalate (LEXAPRO) 10 MG tablet, Take 1 tablet (10 mg total) by mouth once daily. (Patient not taking: Reported on 1/8/2020), Disp: 30 tablet, Rfl: 5    No current facility-administered medications on file prior to visit.         Review of Systems   Psychiatric/Behavioral: Positive for dysphoric mood and sleep disturbance. The patient is nervous/anxious.        Objective:      Physical Exam   Constitutional: He is oriented to person, place, and time.   Good blood pressure control  Obese with a BMI of 33.9 is up 1.3 lb from his July 25, 2019 visit   Neurological: He is alert and oriented to person, place, and time.   Psychiatric: His speech is normal. Judgment and thought content normal. His mood appears anxious (Mildly). His affect is not blunt, not labile and not inappropriate. He is slowed. Cognition and memory are normal. He exhibits a depressed mood. He is attentive.   Nursing note and vitals reviewed.      Assessment:       1. Anxiety and depression    2. Essential hypertension    3. Hyperlipidemia, unspecified hyperlipidemia type    4. Abnormal PSA    5. Prostate cancer    6. Pneumococcal vaccination indicated    7. BMI 33.0-33.9,adult        Plan:       1. Anxiety and depression  Again attempt low-dose Lexapro  - escitalopram oxalate (LEXAPRO) 10 MG tablet; Take 1 tablet (10 mg total) by mouth once daily.  Dispense: 30 tablet; Refill: 3    2. Essential hypertension  Good control with no changes needed  - Lipid panel; Future  - Comprehensive metabolic panel; Future  - CBC auto differential; Future    3. Hyperlipidemia, unspecified hyperlipidemia type  Await labs  - Lipid panel; Future  - Comprehensive metabolic panel; Future  - CK; Future    4. Abnormal PSA  Await lab results  - PROSTATE SPECIFIC ANTIGEN,  DIAGNOSTIC; Future    5. Prostate cancer  See above  - PROSTATE SPECIFIC ANTIGEN, DIAGNOSTIC; Future    6. Pneumococcal vaccination indicated  Given  - (In Office Administered) Pneumococcal Conjugate Vaccine (13 Valent) (IM)    7. BMI 33.0-33.9,adult

## 2020-01-08 NOTE — PATIENT INSTRUCTIONS
Walking for Fitness  Fitness walking has something for everyone, even people who are already fit. Walking is one of the safest ways to condition your body aerobically. It can boost energy, help you lose weight, and reduce stress.    Physical benefits  · Walking strengthens your heart and lungs, and tones your muscles.  · When walking, your feet land with less impact than in other sports. This reduces chances of muscle, bone, and joint injury.  · Regular walking improves your cholesterol levels and lowers your risk of heart disease. And it helps you control your blood sugar if you have diabetes.  · Walking is a weight-bearing activity, which helps maintain bone density. This can help prevent osteoporosis.  Personal rewards  · Taking walks can help you relax and manage stress. And fitness walking may make you feel better about yourself.  · Walking can help you sleep better at night and make you less likely to be depressed.  · Regular walking may help maintain your memory as you get older.  · Walking is a great way to spend extra time with friends and family members. Be sure to invite your dog along!  Q&A about fitness walking  Q: Will walking keep me fit?  A: Yes. Regular walking at the right pace gives you all the benefits of other aerobic activities, such as jogging and swimming.  Q: Will walking help me lose weight and keep it off?  A: Yes. Per mile, walking can burn as many calories as jogging. Your health care provider can help work walking into your weight-loss plan.  Q: Is walking safe for my health?  A: Yes. Walking is safe if you have high blood pressure, diabetes, heart disease, or other conditions. Talk to your healthcare provider before you start.  Date Last Reviewed: 4/1/2017 © 2000-2017 [employer]. [address] [place] [address], [place] [address]. All rights reserved. This information is not intended as a substitute for professional medical care. Always follow your healthcare professional's  instructions.        Weight Management: Getting Started  Healthy bodies come in all shapes and sizes. Not all bodies are made to be thin. For some people, a healthy weight is higher than the average weight listed on weight charts. Your healthcare provider can help you decide on a healthy weight for you.    Reasons to lose weight  Losing weight can help with some health problems, such as high blood pressure, heart disease, diabetes, sleep apnea, and arthritis. You may also feel more energy.  Set your long-term goal  Your goal doesn't even have to be a specific weight. You may decide on a fitness goal (such as being able to walk 10 miles a week), or a health goal (such as lowering your blood pressure). Choose a goal that is measurable and reasonable, so you know when you've reached it. A goal of reaching a BMI of less than 25 is not always reasonable (or possible).   Make an action plan  Habits dont change overnight. Setting your goals too high can leave you feeling discouraged if you cant reach them. Be realistic. Choose one or two small changes you can make now. Set an action plan for how you are going to make these changes. When you can stick to this plan, keep making a few more small changes. Taking small steps will help you stay on the path to success.  Track your progress  Write down your goals. Then, keep a daily record of your progress. Write down what you eat and how active you are. This record lets you look back on how much youve done. It may also help when youre feeling frustrated. Reward yourself for success. Even if you dont reach every goal, give yourself credit for what you do get done.  Get support  Encouragement from others can help make losing weight easier. Ask your family members and friends for support. They may even want to join you. Also look to your healthcare provider, registered dietitian, and  for help. Your local hospital can give you more information about  nutrition, exercise, and weight loss.  Date Last Reviewed: 1/31/2016  © 8122-3628 [employer]. [address] [place] [address], [place] [address]. All rights reserved. This information is not intended as a substitute for professional medical care. Always follow your healthcare professional's instructions.        Controlling High Blood Pressure  High blood pressure (hypertension) is often called the silent killer. This is because many people who have it dont know it. High blood pressure is defined as 140/90 mm Hg or higher. Know your blood pressure and remember to check it regularly. Doing so can save your life. Here are some things you can do to help control your blood pressure.    Choose heart-healthy foods  · Select low-salt, low-fat foods. Limit sodium intake to 2,400 mg per day or the amount suggested by your healthcare provider.  · Limit canned, dried, cured, packaged, and fast foods. These can contain a lot of salt.  · Eat 8 to 10 servings of fruits and vegetables every day.  · Choose lean meats, fish, or chicken.  · Eat whole-grain pasta, brown rice, and beans.  · Eat 2 to 3 servings of low-fat or fat-free dairy products.  · Ask your doctor about the DASH eating plan. This plan helps reduce blood pressure.  · When you go to a restaurant, ask that your meal be prepared with no added salt.  Maintain a healthy weight  · Ask your healthcare provider how many calories to eat a day. Then stick to that number.  · Ask your healthcare provider what weight range is healthiest for you. If you are overweight, a weight loss of only 3% to 5% of your body weight can help lower blood pressure. Generally, a good weight loss goal is to lose 10% of your body weight in a year.  · Limit snacks and sweets.  · Get regular exercise.  Get up and get active  · Choose activities you enjoy. Find ones you can do with friends or family. This includes bicycling, dancing, walking, and jogging.  · Park farther away from building  entrances.  · Use stairs instead of the elevator.  · When you can, walk or bike instead of driving.  · [place] leaves, garden, or do household repairs.  · Be active at a moderate to vigorous level of physical activity for at least 40 minutes for a minimum of 3 to 4 days a week.   Manage stress  · Make time to relax and enjoy life. Find time to laugh.  · Communicate your concerns with your loved ones and your healthcare provider.  · Visit with family and friends, and keep up with hobbies.  Limit alcohol and quit smoking  · Men should have no more than 2 drinks per day.  · Women should have no more than 1 drink per day.  · Talk with your healthcare provider about quitting smoking. Smoking significantly increases your risk for heart disease and stroke. Ask your healthcare provider about community smoking cessation programs and other options.  Medicines  If lifestyle changes arent enough, your healthcare provider may prescribe high blood pressure medicine. Take all medicines as prescribed. If you have any questions about your medicines, ask your healthcare provider before stopping or changing them.   Date Last Reviewed: 4/27/2016 © 2000-2017 [employer]. [address] [place] [address], [place] [address]. All rights reserved. This information is not intended as a substitute for professional medical care. Always follow your healthcare professional's instructions.

## 2020-01-09 LAB — COMPLEXED PSA SERPL-MCNC: 0.03 NG/ML (ref 0–4)

## 2020-01-10 ENCOUNTER — TELEPHONE (OUTPATIENT)
Dept: FAMILY MEDICINE | Facility: CLINIC | Age: 60
End: 2020-01-10

## 2020-01-10 NOTE — TELEPHONE ENCOUNTER
Patient was informed. Stated he just filled the simvastatin 40 mg. Has been taking it daily. He will increase to 80 to use them up. If he has muscle pain or problems he will let us know. ( dorothea )

## 2020-01-10 NOTE — TELEPHONE ENCOUNTER
----- Message from Abad Rasmussen MD sent at 1/9/2020  6:42 PM CST -----  His chemistry panel, blood count, CPK, and PSA all look good.  The PSA continues a long slow fall.      His cholesterol levels are increasing.  Has he been skipping doses of his Lipitor/atorvastatin 40 mg regularly?  If so he needs to take it, if he has been taking it we either need to increase it to 80 mg or switch to Crestor 40 mg which is a more potent agent.    A letter was sent with results

## 2020-01-10 NOTE — TELEPHONE ENCOUNTER
Do not increase simvastatin to 80 mg.  40 mg is the maximum dose on simvastatin.  It causes severe muscle injury and pain at higher doses.

## 2020-01-11 DIAGNOSIS — E78.5 HYPERLIPIDEMIA, UNSPECIFIED HYPERLIPIDEMIA TYPE: Primary | ICD-10-CM

## 2020-01-11 DIAGNOSIS — J00 ACUTE NASOPHARYNGITIS: ICD-10-CM

## 2020-01-13 RX ORDER — FLUTICASONE PROPIONATE 50 MCG
1 SPRAY, SUSPENSION (ML) NASAL DAILY PRN
Qty: 1 BOTTLE | Refills: 3 | Status: SHIPPED | OUTPATIENT
Start: 2020-01-13 | End: 2020-08-25 | Stop reason: SDUPTHER

## 2020-01-14 ENCOUNTER — TELEPHONE (OUTPATIENT)
Dept: FAMILY MEDICINE | Facility: CLINIC | Age: 60
End: 2020-01-14

## 2020-01-15 RX ORDER — ATORVASTATIN CALCIUM 80 MG/1
80 TABLET, FILM COATED ORAL DAILY
Qty: 90 TABLET | Refills: 3 | Status: SHIPPED | OUTPATIENT
Start: 2020-01-15 | End: 2020-09-11 | Stop reason: SDUPTHER

## 2020-01-15 NOTE — TELEPHONE ENCOUNTER
----- Message from Marie Anderson sent at 1/15/2020  3:22 PM CST -----  Type:  Patient Returning Call    Who Called:  patient  Who Left Message for Patient:  Nurse Valero  Does the patient know what this is regarding?:  yes  Best Call Back Number:  [phone]  Additional Information:  Please return call to patient

## 2020-01-16 NOTE — TELEPHONE ENCOUNTER
Lipitor 80 mg sent to [facility].  Orders in for lipid panel and liver panel to do in approximately three months

## 2020-03-06 ENCOUNTER — TELEPHONE (OUTPATIENT)
Dept: FAMILY MEDICINE | Facility: CLINIC | Age: 60
End: 2020-03-06

## 2020-03-06 DIAGNOSIS — K21.9 GASTROESOPHAGEAL REFLUX DISEASE WITHOUT ESOPHAGITIS: ICD-10-CM

## 2020-03-06 RX ORDER — OMEPRAZOLE 40 MG/1
40 CAPSULE, DELAYED RELEASE ORAL EVERY MORNING
Qty: 90 CAPSULE | Refills: 3 | Status: SHIPPED | OUTPATIENT
Start: 2020-03-06 | End: 2020-09-14 | Stop reason: SDUPTHER

## 2020-03-06 NOTE — TELEPHONE ENCOUNTER
----- Message from Daren Bonds sent at 3/6/2020 10:56 AM CST -----  Contact: pt  Type:  Patient Returning Call    Who Called:  Pt  Does the patient know what this is regarding?:  Meds that were called in last week or so  Best Call Back Number:    Additional Information:  Please give pt a call back as soon as possible. Thank you

## 2020-03-09 NOTE — TELEPHONE ENCOUNTER
Left msg for patient that Omeprazole was sent to [facility] if that is what he is calling about. If not, please call office.

## 2020-03-18 ENCOUNTER — TELEPHONE (OUTPATIENT)
Dept: UROLOGY | Facility: CLINIC | Age: 60
End: 2020-03-18

## 2020-03-18 NOTE — TELEPHONE ENCOUNTER
----- Message from Savannah Avila sent at 3/18/2020  9:03 AM CDT -----  Contact: Patient Sooner Appt  Type:  Sooner Apoointment Request    Caller is requesting a sooner appointment.  Caller declined first available appointment listed below.  Caller will not accept being placed on the waitlist and is requesting a message be sent to doctor.    Name of Caller:  Patient  When is the first available appointment?  6/2  Symptoms:  EP Patient of Dr Valles need follow after having prostate cancer  Best Call Back Number:  [phone]

## 2020-03-18 NOTE — TELEPHONE ENCOUNTER
The pt has not been seen by  since 4/2018. He was seeing him for his prostate cancer treated with brachytehrapy in 2012. He does need follow up with a urologist for the rest of his life. However if he has no urgent issues to address then due to covid concerns we will make him a 6 month and will plan to see him yearly. His last psa was 0.03 1/2020.     If he would like to discuss a more urgent concern please see if he is willing to do telehealth. If not, then please let me know what his urgent issues are and we can decide if urgent appt necessary at this time.         Component PSA, SCREEN PSA DIAGNOSTIC   Latest Ref Rng & Units 0.00 - 4.00 ng/mL 0.00 - 4.00 ng/mL   1/8/2020  0.03   1/25/2019  0.05   10/20/2017     10/17/2016  0.12   10/23/2015  0.17   1/30/2015  0.55   11/7/2014  0.41   7/2/2014 0.39    5/28/2014  0.70   12/2/2013  0.37   6/14/2013 <0.010    1/9/2013 0.07    6/30/2012 4/28/2012 3.61    4/2/2012 4.88 (H)    3/5/2011 2.4    11/27/2009 1.8    6/19/2009 2.1    2/17/2009 2.4    1/2/2008 1.6    11/29/2006 1.5    1/6/2006 1.1    12/28/2004 1.1

## 2020-03-18 NOTE — TELEPHONE ENCOUNTER
Spoke with patient he states he is usually a patient of  and needs to establish care with another urologist due to him living in [place] and  has moved to [place]. Please advise

## 2020-03-18 NOTE — TELEPHONE ENCOUNTER
Spoke with patient informed him of recommendations. Patient verbally voiced understanding and will see  in 6 months.

## 2020-03-19 ENCOUNTER — TELEPHONE (OUTPATIENT)
Dept: FAMILY MEDICINE | Facility: CLINIC | Age: 60
End: 2020-03-19

## 2020-03-19 DIAGNOSIS — F32.A ANXIETY AND DEPRESSION: ICD-10-CM

## 2020-03-19 DIAGNOSIS — F41.9 ANXIETY AND DEPRESSION: ICD-10-CM

## 2020-03-19 RX ORDER — ESCITALOPRAM OXALATE 10 MG/1
TABLET ORAL
Qty: 90 TABLET | Refills: 0 | Status: SHIPPED | OUTPATIENT
Start: 2020-03-19 | End: 2020-05-20

## 2020-03-19 NOTE — TELEPHONE ENCOUNTER
----- Message from Melvin Sethi sent at 3/19/2020  1:26 PM CDT -----  Contact: same  Patient called in and stated his pharmacy told him patient needs physician to send over a new Rx for his escitalopram oxalate (LEXAPRO) 10 MG tablet as he has no refills left.      escitalopram oxalate (LEXAPRO) 10 MG tablet, 30 tablets with 3 refills last filled on 1/8/2020 1/7/2021 --  Sig - Route: Take 1 tablet (10 mg total) by mouth once daily. - Oral      [id number] [place] - [address]  [address] [place] [address]  Phone: [phone] Fax: [phone]    Patient call back number is [phone]

## 2020-03-19 NOTE — TELEPHONE ENCOUNTER
Patient states [facility] told them there are no more refills. I faxed a copy of the refills Dr. Rasmussen sent to them.

## 2020-03-20 ENCOUNTER — TELEPHONE (OUTPATIENT)
Dept: FAMILY MEDICINE | Facility: CLINIC | Age: 60
End: 2020-03-20

## 2020-03-20 NOTE — TELEPHONE ENCOUNTER
----- Message from Karin Miles sent at 3/20/2020  8:01 AM CDT -----  Contact: self  Type: Needs Medical Advice    Who Called:  self  Symptoms (please be specific):  Sinus issues  How long has patient had these symptoms:  Few weeks  Pharmacy name and phone #:    [id number] [place] - [address]  [address] [address]  Phone: [phone] Fax: [phone]  Best Call Back Number: [phone] (home)   Additional Information: Patient is having sinus issues and would like a antibiotic called in to his pharmacy. Please call patient. Thanks!

## 2020-03-20 NOTE — TELEPHONE ENCOUNTER
Patient coughing up green mucus a couple weeks, has been [place] last 2 w[place]- afebrile, no sob or wheezing, no sore throat or body aches, has sinus pressure above eyes, no pain in teeth area.

## 2020-03-20 NOTE — TELEPHONE ENCOUNTER
Does not sound like a sinus infection.  Recommend saline nasal spray, flonase daily.  Avoid antihistamines.

## 2020-04-14 DIAGNOSIS — I10 ESSENTIAL HYPERTENSION: ICD-10-CM

## 2020-04-14 RX ORDER — LOSARTAN POTASSIUM AND HYDROCHLOROTHIAZIDE 12.5; 1 MG/1; MG/1
TABLET ORAL
Qty: 90 TABLET | Refills: 0 | Status: SHIPPED | OUTPATIENT
Start: 2020-04-14 | End: 2020-05-18 | Stop reason: RX

## 2020-04-23 DIAGNOSIS — I10 ESSENTIAL HYPERTENSION: Primary | ICD-10-CM

## 2020-04-23 RX ORDER — HYDROCHLOROTHIAZIDE 12.5 MG/1
12.5 TABLET ORAL DAILY
COMMUNITY
End: 2020-04-23 | Stop reason: SDUPTHER

## 2020-04-23 RX ORDER — LOSARTAN POTASSIUM 100 MG/1
100 TABLET ORAL DAILY
COMMUNITY
End: 2020-04-23 | Stop reason: SDUPTHER

## 2020-04-24 RX ORDER — HYDROCHLOROTHIAZIDE 12.5 MG/1
12.5 TABLET ORAL DAILY
Qty: 90 TABLET | Refills: 3 | Status: SHIPPED | OUTPATIENT
Start: 2020-04-24 | End: 2020-05-18

## 2020-04-24 RX ORDER — LOSARTAN POTASSIUM 100 MG/1
100 TABLET ORAL DAILY
Qty: 90 TABLET | Refills: 3 | Status: SHIPPED | OUTPATIENT
Start: 2020-04-24 | End: 2020-09-11 | Stop reason: SDUPTHER

## 2020-05-09 ENCOUNTER — LAB VISIT (OUTPATIENT)
Dept: LAB | Facility: HOSPITAL | Age: 60
End: 2020-05-09
Attending: FAMILY MEDICINE
Payer: COMMERCIAL

## 2020-05-09 DIAGNOSIS — E78.5 HYPERLIPIDEMIA, UNSPECIFIED HYPERLIPIDEMIA TYPE: ICD-10-CM

## 2020-05-09 LAB
ALBUMIN SERPL BCP-MCNC: 4.5 G/DL (ref 3.5–5.2)
ALP SERPL-CCNC: 73 U/L (ref 55–135)
ALT SERPL W/O P-5'-P-CCNC: 38 U/L (ref 10–44)
AST SERPL-CCNC: 25 U/L (ref 10–40)
BILIRUB DIRECT SERPL-MCNC: 0.2 MG/DL (ref 0.1–0.3)
BILIRUB SERPL-MCNC: 0.5 MG/DL (ref 0.1–1)
CHOLEST SERPL-MCNC: 157 MG/DL (ref 120–199)
CHOLEST/HDLC SERPL: 2.3 {RATIO} (ref 2–5)
HDLC SERPL-MCNC: 68 MG/DL (ref 40–75)
HDLC SERPL: 43.3 % (ref 20–50)
LDLC SERPL CALC-MCNC: 78.2 MG/DL (ref 63–159)
NONHDLC SERPL-MCNC: 89 MG/DL
PROT SERPL-MCNC: 8 G/DL (ref 6–8.4)
TRIGL SERPL-MCNC: 54 MG/DL (ref 30–150)

## 2020-05-09 PROCEDURE — 80076 HEPATIC FUNCTION PANEL: CPT

## 2020-05-09 PROCEDURE — 36415 COLL VENOUS BLD VENIPUNCTURE: CPT | Mod: PO

## 2020-05-09 PROCEDURE — 80061 LIPID PANEL: CPT

## 2020-05-18 ENCOUNTER — NURSE TRIAGE (OUTPATIENT)
Dept: ADMINISTRATIVE | Facility: CLINIC | Age: 60
End: 2020-05-18

## 2020-05-18 DIAGNOSIS — I10 ESSENTIAL HYPERTENSION: ICD-10-CM

## 2020-05-18 RX ORDER — HYDROCHLOROTHIAZIDE 25 MG/1
25 TABLET ORAL DAILY
Qty: 90 TABLET | Refills: 1 | Status: SHIPPED | OUTPATIENT
Start: 2020-05-18 | End: 2020-09-11 | Stop reason: SDUPTHER

## 2020-05-18 NOTE — TELEPHONE ENCOUNTER
Called pt and advised of Dr. Rasmussen note. Pt is willing to increase HCTZ dosage. Pt stated he would rather increase dosage b/c of his readings.

## 2020-05-18 NOTE — TELEPHONE ENCOUNTER
Pt states his LP=373/85 this am and last night CU=8121/90s. Pt asymptomatic. Pt advised per protocol and pt verbalizes understanding. Pt is asking if his BP medication and anxiety medication are compatible and is requesting callback from PCP. Message sent to PCP staff.     Reason for Disposition   Systolic BP >= 130 OR Diastolic >= 80, and is taking BP medications    Additional Information   Negative: Sounds like a life-threatening emergency to the triager   Negative: Pregnant > 20 weeks and new hand or face swelling   Negative: Pregnant > 20 weeks and BP > 140/90   Negative: Systolic BP >= 160 OR Diastolic >= 100, and any cardiac or neurologic symptoms (e.g., chest pain, difficulty breathing, unsteady gait, blurred vision)   Negative: Patient sounds very sick or weak to the triager   Negative: BP Systolic BP >= 140 OR Diastolic >= 90 and postpartum < 4 weeks   Negative: Systolic BP >= 180 OR Diastolic >= 110, and missed most recent dose of blood pressure medication   Negative: Systolic BP >= 180 OR Diastolic >= 110   Negative: Patient wants to be seen   Negative: Ran out of BP medications   Negative: Taking BP medications and feels is having side effects (e.g., impotence, cough, dizziness)   Negative: Systolic BP >= 160 OR Diastolic >= 100   Negative: Systolic BP >= 130 OR Diastolic >= 80, and pregnant    Protocols used: HIGH BLOOD PRESSURE-A-OH

## 2020-05-18 NOTE — TELEPHONE ENCOUNTER
Called patient- concerned about bp readings-last night 171/89 p50.  This morning 145/89 -today while I was on phone it's 127/65 p62- also last night had 153/94 p57 and night before 173/114 p61

## 2020-05-18 NOTE — TELEPHONE ENCOUNTER
HCTZ 12.5 mg increased to 25 mg and sent to [facility].  He may take two of his 12.5's at a time to use them up.

## 2020-05-18 NOTE — TELEPHONE ENCOUNTER
He recently had a dosage change because the 100 dash 12.5 combination pill was not available.  Make sure he is taking both the losartan 100 mg and the hydrochlorothiazide 12.5 mg daily.    There is no interaction with his Lexapro or any other medication he is taking that we are aware of.    It looks like his nighttime readings are elevated in the daytime was better, possibly related to the low-dose hydrochlorothiazide.  We could increase the HCTZ to 25 mg daily, an alternative would be a adding a low-dose amlodipine/Norvasc.

## 2020-05-20 DIAGNOSIS — F41.9 ANXIETY AND DEPRESSION: ICD-10-CM

## 2020-05-20 DIAGNOSIS — F32.A ANXIETY AND DEPRESSION: ICD-10-CM

## 2020-05-20 RX ORDER — ESCITALOPRAM OXALATE 10 MG/1
10 TABLET ORAL DAILY
Qty: 90 TABLET | Refills: 0 | OUTPATIENT
Start: 2020-05-20

## 2020-05-20 RX ORDER — ESCITALOPRAM OXALATE 10 MG/1
TABLET ORAL
Qty: 90 TABLET | Refills: 1 | Status: SHIPPED | OUTPATIENT
Start: 2020-05-20 | End: 2020-09-11 | Stop reason: SDUPTHER

## 2020-05-20 NOTE — TELEPHONE ENCOUNTER
Patient says the Lexapro is working well for his anxiety and feels like his old self. [facility]

## 2020-05-20 NOTE — TELEPHONE ENCOUNTER
----- Message from Rosa Leon sent at 5/20/2020 12:38 PM CDT -----  Contact: pt  Pt calling states would the nurses give him a call about a prescription, that it is personal...[phone]

## 2020-05-22 ENCOUNTER — TELEPHONE (OUTPATIENT)
Dept: FAMILY MEDICINE | Facility: CLINIC | Age: 60
End: 2020-05-22

## 2020-05-22 RX ORDER — CHLORHEXIDINE GLUCONATE ORAL RINSE 1.2 MG/ML
15 SOLUTION DENTAL 2 TIMES DAILY
Qty: 473 ML | Refills: 0 | Status: SHIPPED | OUTPATIENT
Start: 2020-05-22 | End: 2020-06-05

## 2020-05-22 NOTE — TELEPHONE ENCOUNTER
"Patient had teeth implants put in and had a "funky taste" in his mouth- dentist had given him the Peridex but dentist is closed. Leaving to go [place] Tuesday.   "

## 2020-05-22 NOTE — TELEPHONE ENCOUNTER
----- Message from Lynn Metcalf sent at 5/22/2020  3:02 PM CDT -----  Contact: self [phone]  Patient requesting a refill on peridex mouthwash, requesting 2 bottles.    Patient will be using   [id number] [place] - [address]  [address] [address]  Phone: [phone] Fax: [phone]    Please call patient at [phone].     Thanks!

## 2020-07-06 ENCOUNTER — TELEPHONE (OUTPATIENT)
Dept: FAMILY MEDICINE | Facility: CLINIC | Age: 60
End: 2020-07-06

## 2020-07-06 NOTE — TELEPHONE ENCOUNTER
----- Message from Rosa Leon sent at 7/6/2020  7:50 AM CDT -----  Contact: pt  Pt states that he called last week and the nurses told him to call back today,so he would like a call back today,please...[phone] (home)

## 2020-07-06 NOTE — TELEPHONE ENCOUNTER
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 Rasmussen to know that the medication he gave him for the depression is working well. He also stated that he believes that all the sinus infections he's been having were caused from the chemicals. Advised Dr. Rasmussen of message. He stated that it is a good thing to hear that the mediation is working for depression.

## 2020-07-06 NOTE — TELEPHONE ENCOUNTER
----- Message from Sofia Sosa sent at 7/6/2020  9:54 AM CDT -----  Regarding: advice  Contact: pt  Patient called and asked if you will give him a copy of any diagnosis he has had in the past 4 yrs   Patient states it is job related , he will need this information today,    Call back [phone]

## 2020-08-04 ENCOUNTER — OFFICE VISIT (OUTPATIENT)
Dept: FAMILY MEDICINE | Facility: CLINIC | Age: 60
End: 2020-08-04
Payer: COMMERCIAL

## 2020-08-04 VITALS
DIASTOLIC BLOOD PRESSURE: 70 MMHG | BODY MASS INDEX: 32.31 KG/M2 | HEIGHT: 74 IN | WEIGHT: 251.75 LBS | TEMPERATURE: 98 F | HEART RATE: 65 BPM | SYSTOLIC BLOOD PRESSURE: 128 MMHG

## 2020-08-04 DIAGNOSIS — J32.9 SINUSITIS, UNSPECIFIED CHRONICITY, UNSPECIFIED LOCATION: Primary | ICD-10-CM

## 2020-08-04 DIAGNOSIS — I10 ESSENTIAL HYPERTENSION: ICD-10-CM

## 2020-08-04 DIAGNOSIS — E66.9 CLASS 1 OBESITY WITH BODY MASS INDEX (BMI) OF 32.0 TO 32.9 IN ADULT, UNSPECIFIED OBESITY TYPE, UNSPECIFIED WHETHER SERIOUS COMORBIDITY PRESENT: ICD-10-CM

## 2020-08-04 PROCEDURE — 3008F BODY MASS INDEX DOCD: CPT | Mod: CPTII,S$GLB,, | Performed by: FAMILY MEDICINE

## 2020-08-04 PROCEDURE — 99214 OFFICE O/P EST MOD 30 MIN: CPT | Mod: S$GLB,,, | Performed by: FAMILY MEDICINE

## 2020-08-04 PROCEDURE — 99214 PR OFFICE/OUTPT VISIT, EST, LEVL IV, 30-39 MIN: ICD-10-PCS | Mod: S$GLB,,, | Performed by: FAMILY MEDICINE

## 2020-08-04 PROCEDURE — 3074F PR MOST RECENT SYSTOLIC BLOOD PRESSURE < 130 MM HG: ICD-10-PCS | Mod: CPTII,S$GLB,, | Performed by: FAMILY MEDICINE

## 2020-08-04 PROCEDURE — 3078F DIAST BP <80 MM HG: CPT | Mod: CPTII,S$GLB,, | Performed by: FAMILY MEDICINE

## 2020-08-04 PROCEDURE — 3074F SYST BP LT 130 MM HG: CPT | Mod: CPTII,S$GLB,, | Performed by: FAMILY MEDICINE

## 2020-08-04 PROCEDURE — 3008F PR BODY MASS INDEX (BMI) DOCUMENTED: ICD-10-PCS | Mod: CPTII,S$GLB,, | Performed by: FAMILY MEDICINE

## 2020-08-04 PROCEDURE — 99999 PR PBB SHADOW E&M-EST. PATIENT-LVL IV: ICD-10-PCS | Mod: PBBFAC,,, | Performed by: FAMILY MEDICINE

## 2020-08-04 PROCEDURE — 99999 PR PBB SHADOW E&M-EST. PATIENT-LVL IV: CPT | Mod: PBBFAC,,, | Performed by: FAMILY MEDICINE

## 2020-08-04 PROCEDURE — 3078F PR MOST RECENT DIASTOLIC BLOOD PRESSURE < 80 MM HG: ICD-10-PCS | Mod: CPTII,S$GLB,, | Performed by: FAMILY MEDICINE

## 2020-08-04 RX ORDER — AMOXICILLIN AND CLAVULANATE POTASSIUM 875; 125 MG/1; MG/1
1 TABLET, FILM COATED ORAL EVERY 12 HOURS
Qty: 20 TABLET | Refills: 0 | Status: SHIPPED | OUTPATIENT
Start: 2020-08-04 | End: 2020-08-14

## 2020-08-04 NOTE — PROGRESS NOTES
Subjective:       Patient ID: Rodo Luna is a 60 y.o. male.    Chief Complaint: Otalgia    This patient is new to me.  Mr Rodo presents to the clinic today with complaints of right ear pain. 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 Patient reports he had a video visit with an MD while offshore and was given steroids and Augmentin antibiotics. Patient states he does have some relief from the fluid in the ear but his ear still hurts and he now has post nasal drip. Patient reports his hearing seems more sensitive since the fluid in the ear has slightly resolved.  Patient educated on sinusitis, otitis media, and treatment. Plan of care discussed with patient, verbalized understanding.    Review of Systems   Constitutional: Negative for activity change, appetite change, chills, diaphoresis and fever.   HENT: Positive for postnasal drip, sinus pressure and sore throat. Negative for congestion, ear pain and sneezing.    Eyes: Negative for pain, discharge, redness and itching.   Respiratory: Negative for apnea, cough, chest tightness, shortness of breath and wheezing.    Cardiovascular: Negative for chest pain and leg swelling.   Gastrointestinal: Negative for abdominal distention, abdominal pain, constipation, diarrhea, nausea and vomiting.   Genitourinary: Negative for difficulty urinating, dysuria, flank pain and frequency.   Skin: Negative for color change, rash and wound.   Neurological: Negative for dizziness.       Patient Active Problem List   Diagnosis    Hyperlipidemia    GERD (gastroesophageal reflux disease)    Abnormal PSA    Prostate cancer    Primary adenocarcinoma of prostate    Hypertension    Varicose veins of leg with swelling    Tenosynovitis, de Quervain    Erectile dysfunction    Gross hematuria    BMI 33.0-33.9,adult    Obesity    History of colon polyps    Left ventricular hypertrophy       Objective:      Physical Exam  Vitals signs reviewed.    Constitutional:       General: He is not in acute distress.     Appearance: Normal appearance. He is well-developed. He is obese.   HENT:      Head: Normocephalic.      Right Ear: Ear canal and external ear normal. A middle ear effusion is present. Tympanic membrane is bulging.      Left Ear: Tympanic membrane, ear canal and external ear normal.      Nose: Nose normal.   Eyes:      Conjunctiva/sclera: Conjunctivae normal.      Pupils: Pupils are equal, round, and reactive to light.   Neck:      Musculoskeletal: Normal range of motion and neck supple.   Cardiovascular:      Rate and Rhythm: Normal rate and regular rhythm.   Pulmonary:      Effort: Pulmonary effort is normal. No respiratory distress.      Breath sounds: Normal breath sounds.   Abdominal:      General: Bowel sounds are normal. There is no distension.      Palpations: Abdomen is soft.      Tenderness: There is no abdominal tenderness.   Skin:     General: Skin is warm and dry.      Findings: No rash.   Neurological:      Mental Status: He is alert and oriented to person, place, and time.   Psychiatric:         Behavior: Behavior normal.         Lab Results   Component Value Date    WBC 8.09 01/08/2020    HGB 16.2 01/08/2020    HCT 48.7 01/08/2020     01/08/2020    CHOL 157 05/09/2020    TRIG 54 05/09/2020    HDL 68 05/09/2020    ALT 38 05/09/2020    AST 25 05/09/2020     01/08/2020    K 3.5 01/08/2020     01/08/2020    CREATININE 1.0 01/08/2020    BUN 11 01/08/2020    CO2 23 01/08/2020    TSH 0.810 04/08/2017    PSA 0.39 07/02/2014    INR 1.0 12/08/2014     The 10-year ASCVD risk score (Deanne GENNA Jr., et al., 2013) is: 11%    Values used to calculate the score:      Age: 60 years      Sex: Male      Is Non- : Yes      Diabetic: No      Tobacco smoker: No      Systolic Blood Pressure: 128 mmHg      Is BP treated: Yes      HDL Cholesterol: 68 mg/dL      Total Cholesterol: 157 mg/dL    Assessment:       1.  Sinusitis, unspecified chronicity, unspecified location    2. Essential hypertension    3. Class 1 obesity with body mass index (BMI) of 32.0 to 32.9 in adult, unspecified obesity type, unspecified whether serious comorbidity present        Plan:       Rodo was seen today for otalgia.    Diagnoses and all orders for this visit:    Sinusitis, unspecified chronicity, unspecified location  -     amoxicillin-clavulanate 875-125mg (AUGMENTIN) 875-125 mg per tablet; Take 1 tablet by mouth every 12 (twelve) hours. for 10 days  Take OTC zyrtec-D or Allegra-D as directed    Essential hypertension  Continue medications as prescribed.  Follow up with PCP    Class 1 obesity with body mass index (BMI) of 32.0 to 32.9 in adult, unspecified obesity type, unspecified whether serious comorbidity present  Continue healthy diet and regular exercise.  Continue medications as prescribed.  Follow up with PCP    Follow up if symptoms worsen or fail to improve.

## 2020-08-04 NOTE — PATIENT INSTRUCTIONS
Sinusitis (Antibiotic Treatment)    The sinuses are air-filled spaces within the bones of the face. They connect to the inside of the nose. Sinusitis is an inflammation of the tissue lining the sinus cavity. Sinus inflammation can occur during a cold. It can also be due to allergies to pollens and other particles in the air. Sinusitis can cause symptoms of sinus congestion and fullness. A sinus infection causes fever, headache and facial pain. There is often green or yellow drainage from the nose or into the back of the throat (post-nasal drip). You have been given antibiotics to treat this condition.  Home care:  · Take the full course of antibiotics as instructed. Do not stop taking them, even if you feel better.  · Drink plenty of water, hot tea, and other liquids. This may help thin mucus. It also may promote sinus drainage.  · Heat may help soothe painful areas of the face. Use a towel soaked in hot water. Or,  the shower and direct the hot spray onto your face. Using a vaporizer along with a menthol rub at night may also help.   · An expectorant containing guaifenesin may help thin the mucus and promote drainage from the sinuses.  · Over-the-counter decongestants may be used unless a similar medicine was prescribed. Nasal sprays work the fastest. Use one that contains phenylephrine or oxymetazoline. First blow the nose gently. Then use the spray. Do not use these medicines more often than directed on the label or symptoms may get worse. You may also use tablets containing pseudoephedrine. Avoid products that combine ingredients, because side effects may be increased. Read labels. You can also ask the pharmacist for help. (NOTE: Persons with high blood pressure should not use decongestants. They can raise blood pressure.)  · Over-the-counter antihistamines may help if allergies contributed to your sinusitis.    · Do not use nasal rinses or irrigation during an acute sinus infection, unless told to by  your health care provider. Rinsing may spread the infection to other sinuses.  · Use acetaminophen or ibuprofen to control pain, unless another pain medicine was prescribed. (If you have chronic liver or kidney disease or ever had a stomach ulcer, talk with your doctor before using these medicines. Aspirin should never be used in anyone under 18 years of age who is ill with a fever. It may cause severe liver damage.)  · Don't smoke. This can worsen symptoms.  Follow-up care  Follow up with your healthcare provider or our staff if you are not improving within the next week.  When to seek medical advice  Call your healthcare provider if any of these occur:  · Facial pain or headache becoming more severe  · Stiff neck  · Unusual drowsiness or confusion  · Swelling of the forehead or eyelids  · Vision problems, including blurred or double vision  · Fever of 100.4ºF (38ºC) or higher, or as directed by your healthcare provider  · Seizure  · Breathing problems  · Symptoms not resolving within 10 days  Date Last Reviewed: 4/13/2015  © 6212-6117 [employer]. [address] [place] [address], [place] [address]. All rights reserved. This information is not intended as a substitute for professional medical care. Always follow your healthcare professional's instructions.    Zyrtec-D or Allegra-D over the counter for sinus congestion and drainage.

## 2020-08-06 ENCOUNTER — TELEPHONE (OUTPATIENT)
Dept: FAMILY MEDICINE | Facility: CLINIC | Age: 60
End: 2020-08-06

## 2020-08-06 NOTE — TELEPHONE ENCOUNTER
----- Message from Alexus Gonzalez sent at 8/6/2020 10:44 AM CDT -----  Regarding: speak to justin/bridgette  Contact: patient  Type: Needs Medical Advice  Who Called:  patient  Best Call Back Number: [phone]  Additional Information: patient states all of his medications need to be refilled for 6 months and sent to [facility].  Patient states he keeps getting message from [facility] that office will not reply to request faxed to refill.  Please call to advise.  Thanks!

## 2020-08-06 NOTE — TELEPHONE ENCOUNTER
No answer patients phone. He should have refills left on all meds. This was faxed over to [facility].

## 2020-08-24 ENCOUNTER — TELEPHONE (OUTPATIENT)
Dept: FAMILY MEDICINE | Facility: CLINIC | Age: 60
End: 2020-08-24

## 2020-08-24 DIAGNOSIS — H92.09 OTALGIA, UNSPECIFIED LATERALITY: Primary | ICD-10-CM

## 2020-08-24 NOTE — TELEPHONE ENCOUNTER
----- Message from Oraila Metcalf sent at 8/24/2020  3:01 PM CDT -----  Regarding: rtc  Contact: MAN AYALA [[id number]]  Patient is returning nurse's phone call.  Please call patient back at [phone].

## 2020-08-24 NOTE — TELEPHONE ENCOUNTER
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 He is asking if MsDon Beryl will call in another round Augmentin.

## 2020-08-24 NOTE — TELEPHONE ENCOUNTER
----- Message from Radha Carter sent at 8/24/2020  7:31 AM CDT -----  Regarding: Patient Advice  Contact: Patient  Type: Needs Medical Advice    Who Called:  Patient  Symptoms (please be specific):  Ear pain not improving.  How long has patient had these symptoms:    Pharmacy name and phone #:  [id number] - [place] - [address] [phone] (Phone)   Best Call Back Number: [phone]  Additional Information:   Patient states the medication he was given for his ear pain is not helping, says he can feel fluid moving around in ear.  Requesting an alternative be called in.  Patient requesting call back this am.

## 2020-08-24 NOTE — TELEPHONE ENCOUNTER
Did it get better and then come back? It has been almost 3 weeks. I think he will need to come in to be seen and reevaluated the ear and see if it is infection or just fluid.  Any fever?  Sinus pain/pressure?

## 2020-08-25 ENCOUNTER — OFFICE VISIT (OUTPATIENT)
Dept: OTOLARYNGOLOGY | Facility: CLINIC | Age: 60
End: 2020-08-25
Payer: COMMERCIAL

## 2020-08-25 ENCOUNTER — PATIENT OUTREACH (OUTPATIENT)
Dept: ADMINISTRATIVE | Facility: OTHER | Age: 60
End: 2020-08-25

## 2020-08-25 VITALS — HEIGHT: 74 IN | WEIGHT: 254.19 LBS | BODY MASS INDEX: 32.62 KG/M2

## 2020-08-25 DIAGNOSIS — H65.191 ACUTE NON-SUPPURATIVE OTITIS MEDIA, RIGHT: Primary | ICD-10-CM

## 2020-08-25 DIAGNOSIS — H92.09 OTALGIA, UNSPECIFIED LATERALITY: ICD-10-CM

## 2020-08-25 PROCEDURE — 99243 PR OFFICE CONSULTATION,LEVEL III: ICD-10-PCS | Mod: S$GLB,,, | Performed by: NURSE PRACTITIONER

## 2020-08-25 PROCEDURE — 99999 PR PBB SHADOW E&M-EST. PATIENT-LVL IV: CPT | Mod: PBBFAC,,, | Performed by: NURSE PRACTITIONER

## 2020-08-25 PROCEDURE — 99999 PR PBB SHADOW E&M-EST. PATIENT-LVL IV: ICD-10-PCS | Mod: PBBFAC,,, | Performed by: NURSE PRACTITIONER

## 2020-08-25 PROCEDURE — 99243 OFF/OP CNSLTJ NEW/EST LOW 30: CPT | Mod: S$GLB,,, | Performed by: NURSE PRACTITIONER

## 2020-08-25 RX ORDER — FLUTICASONE PROPIONATE 50 MCG
1 SPRAY, SUSPENSION (ML) NASAL 2 TIMES DAILY
Qty: 16 G | Refills: 12 | Status: SHIPPED | OUTPATIENT
Start: 2020-08-25 | End: 2020-09-08

## 2020-08-25 RX ORDER — AZELASTINE 1 MG/ML
1 SPRAY, METERED NASAL 2 TIMES DAILY
Qty: 30 ML | Refills: 12 | Status: SHIPPED | OUTPATIENT
Start: 2020-08-25 | End: 2020-09-08

## 2020-08-25 NOTE — PATIENT INSTRUCTIONS
"You have fluid behind your ear drum.    Antibiotics are only effective at resolving infection; however fluid can sometimes remain for 4-12 weeks.    There are only two ways to get rid of fluid stuck behind the ear drum:    (1) The conservative approach -- nasal sprays twice daily (Flonase & Astelin), nasal valsalva/popping, and time (sometimes several weeks).     (2) The invasive approach -- your ear drum is pierced with a sharp tool and the fluid is suctioned out by our ear surgeon. This provides instant relief; however there is the risk that the ear drum may not heal properly or that the fluid may return requiring repeating the procedure or putting a drainage tube in your ear drum.     DIFFERENT TYPES OF "ENT-APPROVED" NASAL SPRAYS:  · Flonase / fluticasone / Nasacort / Rhinocort (steroid spray) is best for stuffy, pressure, fullness.  · Astelin / azelastine (antihistamine spray) is best for itchy, drippy, sneezy.    Use as directed, spraying 1-2 times in each nostril each day. It may take 2-3 days to 2-3 weeks to begin seeing improvement. This medication needs to be taken consistently to see results.     Nasal spray instructions:  Blow nose first gently to clean. Keep chin level with the floor (do not tilt head forward or back). Using the opposite hand (example: right hand for left nostril, left hand for right nostril) insert nasal spray taking caution to direct it AWAY from the middle wall inside the nose (septum) to avoid irritating nasal septum which could cause nosebleed.  Do not tilt spray up but rather flat and out along the roof of your mouth to spray. Angle the tip of the spray out slightly toward the direction of the ears; then spray. Do not take quick vigorous sniff but rather slow gentle inhalation while waiting for medication to absorb into nasal passages. Then administer second spray in same way.     Overall, this is a well-tolerated medication with low side effects. The benefit of nasal steroids as " "opposed to oral steroids is that the nasal steroid spray works primarily in the nose. Common side effects can include: headache, nasal dryness, minor nose bleed.  Rare side effects may include:  septal perforation, elevation in eye pressure, dry eyes, change in smell, allergic reaction.  Notify your provider if you have any concerns or experience these symptoms.     EUSTACHIAN TUBE INSTRUCTIONS:  Nasal valsalva:  Pinch nose and with closed mouth try to "pop" air into ears through the back of the nose. Attempt this several times a day. The more popping you have, the more likely the fluid will resolve.     Ponaris Nasal Emollient is used for the relief of: nasal congestion due to colds, nasal irritation, allergy exacerbations, nasal crusting. Specifically prepared iodized organic oils of pine, eucalyptus, peppermint, cajeput, and cottonseed. To order Ponaris: ask your pharmacist to order it for you or we carry it in our pharmacy downstairs on the first floor.       "

## 2020-08-25 NOTE — PROGRESS NOTES
Health Maintenance Due   Topic Date Due    Shingles Vaccine (1 of 2) 04/15/2011    Pneumococcal Vaccine (Highest Risk) (2 of 3 - PPSV23) 03/04/2020     Updates were requested from care everywhere.  Chart was reviewed for overdue Proactive [facility] Encounters (JAMEY) topics (CRS, Breast Cancer Screening, Eye exam)  Health Maintenance has been updated.  LINKS immunization registry triggered.  Immunizations were reconciled.

## 2020-08-25 NOTE — PROGRESS NOTES
Subjective:       Patient ID: Rodo Luna is a 60 y.o. male.    Chief Complaint: Otitis Media and Ear Fullness    HPI   Patient is new to ENT, referred by CARIN Cordoba for consultation for right ear fullness. 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 He was treated through telemedicine for right OM with Augmentin and Prednisone on 7/24/20. Then he was seen by CARIN Sierra on 8/4/20 and given Augmentin for sinusitis and right otitis media. He denies otalgia or otorrhea. His only symptoms at present are sensation of right ear fullness/pressure, difficulty equalizing right ear, and muffled hearing.     Review of Systems   Constitutional: Negative.    HENT: Positive for hearing loss. Negative for ear pain.         Right ear fullness/pressure  Unable to equalize right ear   Eyes: Negative.    Respiratory: Negative.    Cardiovascular: Negative.    Gastrointestinal: Negative.    Musculoskeletal: Negative.    Integumentary:  Negative.   Neurological: Negative.    Hematological: Negative.    Psychiatric/Behavioral: Negative.          Objective:      Physical Exam  Vitals signs and nursing note reviewed.   Constitutional:       General: He is not in acute distress.     Appearance: He is well-developed. He is not ill-appearing or diaphoretic.   HENT:      Head: Normocephalic and atraumatic.      Right Ear: Ear canal and external ear normal. Decreased hearing noted. A middle ear effusion (gavin serous fluid, non-suppurative) is present. Tympanic membrane is not erythematous.      Left Ear: Hearing, tympanic membrane, ear canal and external ear normal.  No middle ear effusion. Tympanic membrane is not erythematous.      Nose: Nose normal. No mucosal edema or rhinorrhea.      Right Sinus: No maxillary sinus tenderness or frontal sinus tenderness.      Left Sinus: No maxillary sinus tenderness or frontal sinus tenderness.      Mouth/Throat:      Mouth: Mucous membranes are not pale, not dry and not cyanotic.  No oral lesions.      Pharynx: Uvula midline. No oropharyngeal exudate or posterior oropharyngeal erythema.   Eyes:      General: Lids are normal. No scleral icterus.        Right eye: No discharge.         Left eye: No discharge.      Conjunctiva/sclera: Conjunctivae normal.      Pupils: Pupils are equal, round, and reactive to light.   Neck:      Musculoskeletal: Normal range of motion and neck supple.      Thyroid: No thyroid mass or thyromegaly.      Trachea: Trachea normal. No tracheal deviation.   Cardiovascular:      Rate and Rhythm: Normal rate.   Pulmonary:      Effort: Pulmonary effort is normal. No respiratory distress.      Breath sounds: No stridor. No wheezing.   Musculoskeletal: Normal range of motion.   Lymphadenopathy:      Head:      Right side of head: No submental, submandibular, tonsillar, preauricular or posterior auricular adenopathy.      Left side of head: No submental, submandibular, tonsillar, preauricular or posterior auricular adenopathy.      Cervical: No cervical adenopathy.      Right cervical: No superficial or posterior cervical adenopathy.     Left cervical: No superficial or posterior cervical adenopathy.   Skin:     General: Skin is warm and dry.      Coloration: Skin is not pale.      Findings: No lesion or rash.   Neurological:      Mental Status: He is alert and oriented to person, place, and time.      Coordination: Coordination normal.      Gait: Gait normal.   Psychiatric:         Speech: Speech normal.         Behavior: Behavior normal. Behavior is cooperative.         Thought Content: Thought content normal.         Judgment: Judgment normal.         Assessment:     Acute non-suppurative effusion AD      Plan:     Flonase & Astelin BID, nasal valsalva several times per day.  Discussed ETD, serous/non-suppurative OME vs suppurative OM at length.   Patient to call back for further antibiotics if throbbing otalgia.   Briefly discussed myringotomy vs tympanostomy tubes.    Return to clinic in 6 weeks if not significantly improved. Will need NP scope of ET orifice.

## 2020-08-25 NOTE — LETTER
August 25, 2020      Marifer Cordoba, ROBIN  [address] [address]           [place] [facility]  [address] [place] [address]  Phone: [phone]  Fax: [phone]          Patient: Rodo Luna   MR Number: [id number]   YOB: 1960   Date of Visit: 8/25/2020       Dear Marifer Cordoba:    Thank you for referring Rodo Luna to me for evaluation. Attached you will find relevant portions of my assessment and plan of care.    If you have questions, please do not hesitate to call me. I look forward to following Rood Luna along with you.    Sincerely,    Suzette Matthews NP    Enclosure  CC:  No Recipients    If you would like to receive this communication electronically, please contact [email or web address] or [phone] to request more information on [employer] [facility] access.    For providers and/or their staff who would like to refer a patient to [facility], please contact us through our one-stop-shop provider referral line, [facility] , at [phone].    If you feel you have received this communication in error or would no longer like to receive these types of communications, please e-mail [email or web address]

## 2020-09-02 ENCOUNTER — TELEPHONE (OUTPATIENT)
Dept: UROLOGY | Facility: CLINIC | Age: 60
End: 2020-09-02

## 2020-09-02 NOTE — TELEPHONE ENCOUNTER
Spoke with patient can't make appointment on 9/22 with  due to work schedule requesting sooner appointment. Appointment scheduled for tomorrow at 9:40am with . patient verbally voiced understanding.

## 2020-09-02 NOTE — TELEPHONE ENCOUNTER
----- Message from Sherry Resendiz sent at 9/2/2020 11:45 AM CDT -----  Type:  Sooner Apoointment Request    Caller is requesting a sooner appointment.  Caller declined first available appointment listed below.  Caller will not accept being placed on the waitlist and is requesting a message be sent to doctor.    Name of Caller:  patient   When is the first available appointment?  Scheduled for 9/22 leaving 9/15  Symptoms:  Follow up  Best Call Back Number:    Additional Information:  Please advise-thank you

## 2020-09-03 ENCOUNTER — OFFICE VISIT (OUTPATIENT)
Dept: UROLOGY | Facility: CLINIC | Age: 60
End: 2020-09-03
Payer: COMMERCIAL

## 2020-09-03 VITALS
BODY MASS INDEX: 33.69 KG/M2 | DIASTOLIC BLOOD PRESSURE: 94 MMHG | SYSTOLIC BLOOD PRESSURE: 148 MMHG | HEIGHT: 73 IN | HEART RATE: 57 BPM | WEIGHT: 254.19 LBS

## 2020-09-03 DIAGNOSIS — C61 PROSTATE CANCER: Primary | ICD-10-CM

## 2020-09-03 LAB
BILIRUB SERPL-MCNC: NORMAL MG/DL
BLOOD URINE, POC: NORMAL
CLARITY, POC UA: CLEAR
COLOR, POC UA: YELLOW
GLUCOSE UR QL STRIP: NORMAL
KETONES UR QL STRIP: NORMAL
LEUKOCYTE ESTERASE URINE, POC: NORMAL
NITRITE, POC UA: NORMAL
PH, POC UA: 7
PROTEIN, POC: NORMAL
SPECIFIC GRAVITY, POC UA: 1.02
UROBILINOGEN, POC UA: NORMAL

## 2020-09-03 PROCEDURE — 81002 POCT URINE DIPSTICK WITHOUT MICROSCOPE: ICD-10-PCS | Mod: S$GLB,,, | Performed by: UROLOGY

## 2020-09-03 PROCEDURE — 3080F PR MOST RECENT DIASTOLIC BLOOD PRESSURE >= 90 MM HG: ICD-10-PCS | Mod: CPTII,S$GLB,, | Performed by: UROLOGY

## 2020-09-03 PROCEDURE — 99999 PR PBB SHADOW E&M-EST. PATIENT-LVL III: CPT | Mod: PBBFAC,,, | Performed by: UROLOGY

## 2020-09-03 PROCEDURE — 3077F SYST BP >= 140 MM HG: CPT | Mod: CPTII,S$GLB,, | Performed by: UROLOGY

## 2020-09-03 PROCEDURE — 99215 OFFICE O/P EST HI 40 MIN: CPT | Mod: 25,S$GLB,, | Performed by: UROLOGY

## 2020-09-03 PROCEDURE — 99215 PR OFFICE/OUTPT VISIT, EST, LEVL V, 40-54 MIN: ICD-10-PCS | Mod: 25,S$GLB,, | Performed by: UROLOGY

## 2020-09-03 PROCEDURE — 99999 PR PBB SHADOW E&M-EST. PATIENT-LVL III: ICD-10-PCS | Mod: PBBFAC,,, | Performed by: UROLOGY

## 2020-09-03 PROCEDURE — 81002 URINALYSIS NONAUTO W/O SCOPE: CPT | Mod: S$GLB,,, | Performed by: UROLOGY

## 2020-09-03 PROCEDURE — 3080F DIAST BP >= 90 MM HG: CPT | Mod: CPTII,S$GLB,, | Performed by: UROLOGY

## 2020-09-03 PROCEDURE — 3008F PR BODY MASS INDEX (BMI) DOCUMENTED: ICD-10-PCS | Mod: CPTII,S$GLB,, | Performed by: UROLOGY

## 2020-09-03 PROCEDURE — 3008F BODY MASS INDEX DOCD: CPT | Mod: CPTII,S$GLB,, | Performed by: UROLOGY

## 2020-09-03 PROCEDURE — 3077F PR MOST RECENT SYSTOLIC BLOOD PRESSURE >= 140 MM HG: ICD-10-PCS | Mod: CPTII,S$GLB,, | Performed by: UROLOGY

## 2020-09-03 NOTE — PATIENT INSTRUCTIONS
psa is lowest its ever been, 0.03 in 1/2020  Pt says psa drawn in may, but I cannot find but was told it was lower   Needs psa yearly for rest of life.   Due for next psa 1/2021 to get us on track   Also psa in a year and f/u after

## 2020-09-03 NOTE — PROGRESS NOTES
[facility] - [place]  Staff: MD Kalyan  PCP: Abad Rasmussen MD  Mychart: pending  Chief Complaint: see below    Subjective:        HPI: Rodo Luna is a 60 y.o. male     Interval history by  6/5/15:   Mr. Luna is a 55-year-old male who was evaluated for hematuria.  The   ultrasound of the kidney was within normal limits.  The cystoscopy did show   evidence of severe prostatitis.  The patient has been informed of those findings   and has been suggested that he should be put in suppressive therapy, taking   Bactrim DS p.o. daily one pill everyday for the next four months.  I explained   to the patient that the remaining of the workup was essentially negative. rbus 5/13/15 negative.ctap w 12/8/14: no kidney stones or lesions  No evidence of tumors in the bladder or upper tract, and also no evidence of significant urolithiasis.  The patient understood.  He is going to be taking Bactrim DS p.o. daily for four months and will have a followup appointment in four months.  All the questions were answered at his satisfaction.  He left the office in satisfactory condition.      Interval history by  4/27/18:   Mr. Luna for is a 58-year-old male last seen inour office in 11/2016.  This patient in 2012 was diagnosed and treated for prostate cancer  (Gl 3+4, Gl 3+3) with eligard x 6 months & brachytherapy.   The patient is here for his followup visit.  He is doing well.  He is not having any significant issues.  The patient denies any urinary symptoms.  He has no nocturia, dysuria or hematuria.  The flow is adequate and he feels like he can empty the bladder satisfactorily.  The patient denies any blood in the stool or difficulty with bowel movements.    Also, denies any bone pain.      Interval history 9/3/20:   He says he had a psa done at [facility] and was told it was 0.00 but I don't see it. Either it was 0.03 in 1/8/20.   Having sinus issues  Good urine  stream. No hematuria. No prostatitis.      Urine history  Urinalysis void: neg   Urine history:   Previous ua's neg    PSA history: +family hx of prostate cancer  1/8/20  0.03  1/25/19 0.05  10/20/17 0.06  10/17/16 0.12  10/23/15 0.17  11/7/14 0.41  7/2/14  0.39  5/28/14 0.70   12/2/13 0.37  6/4/13  <0.01  1/9/13  0.07  2012  Brachytherapy  7/17/12 Gl 3+4 (RB 40%), Gl 3+3 (RM 60%)  6/30/12 4.5  4/28/12 3.61  4/2/12  4.88  3/5/11  2.4      Review of Systems  General ROS: no fevers, no chills  Psychological ROS: no depression  Endocrine ROS: no diabetes  Respiratory ROS: no SOB  Cardiovascular ROS: no CP  Gastrointestinal ROS: no abdominal pain, no constipation, no diarrhea  Musculoskeletal R/OS: no muscle pain  Neurological ROS: no headaches  Dermatological ROS: no rashes  HEENT: no glasses, no sinus   ROS: per HPI         PMHx:  Past Medical History:   Diagnosis Date    Allergy     GERD (gastroesophageal reflux disease)     Hematuria     History of brachytherapy     Hyperlipidemia     Hypertension     Primary adenocarcinoma of prostate     nik 6, s/p brachytherapy    URI (upper respiratory infection) 06/03/2016    offshore MD    Varicose veins of leg with swelling 5/13/2013    Wears glasses        PSHx:  Past Surgical History:   Procedure Laterality Date    COLONOSCOPY  6/12/12    Dr. Lau 5 year recheck    COLONOSCOPY N/A 5/9/2017    Procedure: COLONOSCOPY;  Surgeon: Louis Lau MD;  Location: [facility];  Service: Endoscopy;  Laterality: N/A;  personal history of colon polyps  family history of colon cancer    CYSTOSCOPY      LIPOMA RESECTION      RIGHT THIGH    TONSILLECTOMY         Stents/Valves/Foreign Bodies/Cardiac Evaluation/Cardiologist: 4 stress tests, last one 2020    Family history:    or gyn malignancies: cousins with prostate cancer.   kidney stones: no      Soc Hx:  Social History     Tobacco Use    Smoking status: Never Smoker    Smokeless tobacco: Never Used   Substance Use  Topics    Alcohol use: Yes     Alcohol/week: 10.0 standard drinks     Types: 12 Standard drinks or equivalent per week     Comment: SOCIAL    Drug use: No     No alcohol  Lives in : [place]  : +  Children: 6  Patient's occupation:  for [employer]      Allergies:  Doxycycline, Phenytoin sodium extended, and Sulfa (sulfonamide antibiotics)  Sulfa and doxy - rash     Anticoagulation/Aspirin: none    Objective:     Vitals:    09/03/20 1004   BP: (!) 148/94   Pulse: (!) 57       General:WDWN in NAD  Eyes: PERRLA, normal conjunctiva  Respiratory: no increased work on breathing. No wheezing.   Cardiovascular: No obvious extremity edema. Warm and well perfused.   GI: no palpation of masses. No tenderness. No hepatosplenomegaly to palpation.  Musculoskeletal: normal range of motion of bilateral upper extremities. Normal muscle strength and tone.  Skin: no obvious rashes or lesions. No tightening of skin noted.  Neurologic: CN grossly normal. Normal sensation.   Psychiatric: awake, alert and oriented x 3. Mood and affect normal. Cooperative.     exam   Deferred     LABS REVIEW:  Recent Labs   Lab 10/20/17  0813 01/08/20  1239   WBC 8.05 8.09   Hemoglobin 14.8 16.2   Hematocrit 44.5 48.7   Platelets 232 199   ]  Recent Labs   Lab 10/20/17  0813 01/25/19  0900 01/08/20  1239 05/09/20  0820   Sodium 140 138 140  --    Potassium 4.1 4.1 3.5  --    Chloride 103 100 103  --    CO2 28 30 H 23  --    BUN, Bld 15 12 11  --    Creatinine 1.2 1.1 1.0  --    Glucose 93 97 83  --    Calcium 9.7 10.2 10.1  --    Alkaline Phosphatase 73 68 68 73   Total Protein 7.8 8.1 8.2 8.0   Albumin 4.0 4.3 4.5 4.5   Total Bilirubin 0.8 0.7 0.8 0.5   AST 21 21 19 25   ALT 25 19 21 38   ]    No results found for: LABA1C, HGBA1C      Recent Pertinent urologic PATHOLOGY REVIEW:  See hpi      Recent Pertinent Urologic RADIOGRAPHIC REVIEW: previous relevant images reviewed  See hpi        Assessment:       1. Prostate cancer         [id number] s/p brachytherapy 2012    Plan:       psa is lowest its ever been, 0.03 in 1/2020  Pt says psa drawn in may, but I cannot find but was told it was lower   Needs psa yearly for rest of life.   Due for next psa 1/2021 to get us on track   Also psa in a year and f/u after and I will order all of them      Roxanna Biggs MD

## 2020-09-08 ENCOUNTER — TELEPHONE (OUTPATIENT)
Dept: OTOLARYNGOLOGY | Facility: CLINIC | Age: 60
End: 2020-09-08

## 2020-09-08 DIAGNOSIS — H65.191 ACUTE NON-SUPPURATIVE OTITIS MEDIA, RIGHT: ICD-10-CM

## 2020-09-08 RX ORDER — FLUTICASONE PROPIONATE 50 MCG
1 SPRAY, SUSPENSION (ML) NASAL 2 TIMES DAILY
Qty: 48 G | Refills: 4 | Status: SHIPPED | OUTPATIENT
Start: 2020-09-08 | End: 2021-09-08

## 2020-09-08 RX ORDER — AZELASTINE 1 MG/ML
1 SPRAY, METERED NASAL 2 TIMES DAILY
Qty: 90 ML | Refills: 4 | Status: SHIPPED | OUTPATIENT
Start: 2020-09-08 | End: 2022-01-07

## 2020-09-08 NOTE — TELEPHONE ENCOUNTER
Prescription request for 90 day supply, emailed to you, please advise if we can sign and send back

## 2020-09-11 DIAGNOSIS — F32.A ANXIETY AND DEPRESSION: ICD-10-CM

## 2020-09-11 DIAGNOSIS — I10 ESSENTIAL HYPERTENSION: ICD-10-CM

## 2020-09-11 DIAGNOSIS — F41.9 ANXIETY AND DEPRESSION: ICD-10-CM

## 2020-09-11 DIAGNOSIS — E78.5 HYPERLIPIDEMIA, UNSPECIFIED HYPERLIPIDEMIA TYPE: ICD-10-CM

## 2020-09-11 DIAGNOSIS — K21.9 GASTROESOPHAGEAL REFLUX DISEASE WITHOUT ESOPHAGITIS: ICD-10-CM

## 2020-09-14 DIAGNOSIS — K21.9 GASTROESOPHAGEAL REFLUX DISEASE WITHOUT ESOPHAGITIS: ICD-10-CM

## 2020-09-14 RX ORDER — ATORVASTATIN CALCIUM 80 MG/1
80 TABLET, FILM COATED ORAL DAILY
Qty: 90 TABLET | Refills: 1 | Status: SHIPPED | OUTPATIENT
Start: 2020-09-14 | End: 2021-06-23

## 2020-09-14 RX ORDER — OMEPRAZOLE 40 MG/1
40 CAPSULE, DELAYED RELEASE ORAL EVERY MORNING
Qty: 90 CAPSULE | Refills: 3 | OUTPATIENT
Start: 2020-09-14

## 2020-09-14 RX ORDER — ESCITALOPRAM OXALATE 10 MG/1
10 TABLET ORAL DAILY
Qty: 90 TABLET | Refills: 1 | Status: SHIPPED | OUTPATIENT
Start: 2020-09-14 | End: 2020-09-25

## 2020-09-14 RX ORDER — OMEPRAZOLE 40 MG/1
40 CAPSULE, DELAYED RELEASE ORAL EVERY MORNING
Qty: 90 CAPSULE | Refills: 1 | Status: SHIPPED | OUTPATIENT
Start: 2020-09-14 | End: 2021-05-11

## 2020-09-14 RX ORDER — HYDROCHLOROTHIAZIDE 25 MG/1
25 TABLET ORAL DAILY
Qty: 90 TABLET | Refills: 0 | Status: SHIPPED | OUTPATIENT
Start: 2020-09-14 | End: 2020-10-28

## 2020-09-14 RX ORDER — LOSARTAN POTASSIUM 100 MG/1
100 TABLET ORAL DAILY
Qty: 90 TABLET | Refills: 1 | Status: SHIPPED | OUTPATIENT
Start: 2020-09-14 | End: 2021-06-27

## 2020-09-14 NOTE — TELEPHONE ENCOUNTER
No new care gaps identified.  Powered by [employer]. Reference number: [id number]. 9/14/2020 3:23:08 PM CDT

## 2020-09-14 NOTE — TELEPHONE ENCOUNTER
----- Message from Dutch Deleon sent at 9/14/2020 10:54 AM CDT -----  Regarding: [facility]  Type:  Pharmacy Calling to Clarify an RX    Name of Caller:  Tina  Pharmacy Name:    [facility] - [place], [place] - [address] [place] [address]  Phone: [phone] Fax: [phone]    Prescription Name:    omeprazole (PRILOSEC) 40 MG capsule 90 capsule 3 3/6/2020    Sig - Route: Take 1 capsule (40 mg total) by mouth every morning. - Oral   Sent to pharmacy as: omeprazole (PRILOSEC) 40 MG capsule     What do they need to clarify?:  was sent to [facility]   Best Call Back Number:  [phone]  Additional Information:  needs to go to [facility] b/c INS will only cover at [facility]. Please resend to [facility] at 90 day with 3 refills

## 2020-09-14 NOTE — PROGRESS NOTES
Quick DC. Duplicate Request   Refill Authorization Note   Rodo Luna is requesting a refill authorization.    Brief assessment and rationale for refill: quick dc; duplicate request              Medication reconciliation completed: No      Comments:   Pended Medication(s)       Requested Prescriptions     Refused Prescriptions Disp Refills    omeprazole (PRILOSEC) 40 MG capsule 90 capsule 3     Sig: Take 1 capsule (40 mg total) by mouth every morning.     Refused By: QUINCY MAN     Reason for Refusal: Request already responded to by other means (e.g. phone or fax)        Duplicate Pended Encounter(s)/ Last Prescribed Details:    Cosigner:  Abad Rasmussen MD Signed:  --           Diagnosis Association: Gastroesophageal reflux disease without esophagitis (K21.9)      Original Order:  omeprazole (PRILOSEC) 40 MG capsule [[id number]]      Pharmacy:  [facility] [address]   JEFFREY #:  --   Pharmacy Comments: --          Fill quantity remaining: -- Fill quantity used: -- Next fill due: --       Outpatient Medication Detail     Disp Refills Start End BIPIN   omeprazole (PRILOSEC) 40 MG capsule 90 capsule 1 9/14/2020  No   Sig - Route: Take 1 capsule (40 mg total) by mouth every morning. - Oral   Sent to pharmacy as: omeprazole (PRILOSEC) 40 MG capsule   Class: Normal   Notes to Pharmacy: Please inactivate all prior scripts with same name and strength including any scripts on hold.   Order: [id number]   Cosign for Ordering: Required by Abad Rasmussen MD   Date/Time Signed: 9/14/2020 16:46       E-Prescribing Status: Receipt confirmed by pharmacy (9/14/2020  4:46 PM CDT)   Ordering Encounter Report    Associated Reports   View Encounter                Note composed:4:48 PM 09/14/2020

## 2020-09-25 DIAGNOSIS — F41.9 ANXIETY AND DEPRESSION: ICD-10-CM

## 2020-09-25 DIAGNOSIS — F32.A ANXIETY AND DEPRESSION: ICD-10-CM

## 2020-09-25 RX ORDER — ESCITALOPRAM OXALATE 10 MG/1
TABLET ORAL
Qty: 90 TABLET | Refills: 1 | Status: SHIPPED | OUTPATIENT
Start: 2020-09-25 | End: 2021-09-30

## 2020-09-25 NOTE — TELEPHONE ENCOUNTER
No new care gaps identified.  Powered by [employer]. Reference number: [id number]. 9/25/2020 10:33:15 AM   NADERT

## 2020-09-25 NOTE — PROGRESS NOTES
Refill Authorization Note   Rodo Luna is requesting a refill authorization.     Brief assessment and rationale for refill: APPROVE: PRR          Medication Therapy Plan: CDMR; APPROVE PER PROTOCOL    Medication reconciliation completed: No                    Comments:          Requested Prescriptions   Pending Prescriptions Disp Refills    escitalopram oxalate (LEXAPRO) 10 MG tablet [Pharmacy Med Name: ESCITALOPRAM 10 MG TABLET] 90 tablet 1     Sig: TAKE 1 TABLET BY MOUTH EVERY DAY       Psychiatry:  Antidepressants - SSRI Passed - 9/25/2020 10:32 AM        Passed - Patient is at least 18 years old        Passed - Office Visit within last 12 months or future 90 days.     Recent Outpatient Visits            3 weeks ago Prostate cancer    [facility] Roxanna Biggs MD    1 month ago Acute non-suppurative otitis media, right    [facility] Suzette Matthews NP    1 month ago Sinusitis, unspecified chronicity, unspecified location    [facility] Marifer Cordoba NP    8 months ago Anxiety and depression    [facility] Abad Rasmussen MD    1 year ago Anxiety and depression    [facility] Abad Rasmussen MD          Future Appointments              In 3 months [facility], [facility], [facility]                    Appointments  past 12m or future 3m with PCP    Date Provider   Last Visit   1/8/2020 Abad Rasmussen MD   Next Visit   Visit date not found Abad Rasmussen MD   ED visits in past 90 days: 0     Note composed:3:16 PM 09/25/2020

## 2020-09-29 ENCOUNTER — OFFICE VISIT (OUTPATIENT)
Dept: PRIMARY CARE CLINIC | Facility: CLINIC | Age: 60
End: 2020-09-29
Payer: COMMERCIAL

## 2020-09-29 VITALS
OXYGEN SATURATION: 97 % | TEMPERATURE: 98 F | SYSTOLIC BLOOD PRESSURE: 137 MMHG | RESPIRATION RATE: 18 BRPM | HEART RATE: 62 BPM | DIASTOLIC BLOOD PRESSURE: 73 MMHG

## 2020-09-29 DIAGNOSIS — Z20.822 SUSPECTED COVID-19 VIRUS INFECTION: Primary | ICD-10-CM

## 2020-09-29 PROCEDURE — 99203 PR OFFICE/OUTPT VISIT, NEW, LEVL III, 30-44 MIN: ICD-10-PCS | Mod: S$GLB,,, | Performed by: EMERGENCY MEDICINE

## 2020-09-29 PROCEDURE — 3078F DIAST BP <80 MM HG: CPT | Mod: CPTII,S$GLB,, | Performed by: EMERGENCY MEDICINE

## 2020-09-29 PROCEDURE — 3078F PR MOST RECENT DIASTOLIC BLOOD PRESSURE < 80 MM HG: ICD-10-PCS | Mod: CPTII,S$GLB,, | Performed by: EMERGENCY MEDICINE

## 2020-09-29 PROCEDURE — 3075F SYST BP GE 130 - 139MM HG: CPT | Mod: CPTII,S$GLB,, | Performed by: EMERGENCY MEDICINE

## 2020-09-29 PROCEDURE — U0003 INFECTIOUS AGENT DETECTION BY NUCLEIC ACID (DNA OR RNA); SEVERE ACUTE RESPIRATORY SYNDROME CORONAVIRUS 2 (SARS-COV-2) (CORONAVIRUS DISEASE [COVID-19]), AMPLIFIED PROBE TECHNIQUE, MAKING USE OF HIGH THROUGHPUT TECHNOLOGIES AS DESCRIBED BY CMS-2020-01-R: HCPCS

## 2020-09-29 PROCEDURE — 99203 OFFICE O/P NEW LOW 30 MIN: CPT | Mod: S$GLB,,, | Performed by: EMERGENCY MEDICINE

## 2020-09-29 PROCEDURE — 3075F PR MOST RECENT SYSTOLIC BLOOD PRESS GE 130-139MM HG: ICD-10-PCS | Mod: CPTII,S$GLB,, | Performed by: EMERGENCY MEDICINE

## 2020-09-29 NOTE — PATIENT INSTRUCTIONS
Instructions for Patients with Confirmed or Suspected COVID-19    If you are awaiting your test result, you will either be called or it will be released to the patient portal.  If you have any questions about your test, please visit [email or web address] or call our COVID-19 information line at [phone].      Instructions for non-hospitalized or discharged patients with confirmed or suspected COVID-19:       Stay home except to get medical care.    Separate yourself from other people and animals in your home.    Call ahead before visiting your doctor.    Wear a face mask.    Cover your coughs and sneezes.    Clean your hands often.    Avoid sharing personal household items.    Clean all high-touch surfaces every day.    Monitor your symptoms. Seek prompt medical attention if your illness is worsening (e.g., difficulty breathing). Before seeking care, call your healthcare provider.    If you have a medical emergency and must call 911, notify the dispatcher that you have or are being evaluated for COVID-19. If possible, put on a face mask before emergency medical services arrive.    Use the following symptom-based strategy to return to normal activity following a suspected or confirmed case of COVID-19. Continue isolation until:   o At least 3 days (72 hours) have passed since recovery defined as resolution of fever without the use of fever-reducing medications and improvement in respiratory symptoms (e.g. cough, shortness of breath), and   o At least 10 days have passed since the first positive test.       As one of the next steps, you will receive a call or text from the [place] Department of Health ([facility]) COVID-19 Tracing Team. See the contact information below so you know not to ignore the health departments call. It is important that you contact them back immediately so they can help.     Contact Tracer Number:  [phone]  Caller ID for most carriers: [place] [facility]    What is  contact tracing?   Contact tracing is a process that helps identify everyone who has been in close contact with an infected person. Contact tracers let those people know they may have been exposed and guide them on next steps. Confidentiality is important for everyone; no one will be told who may have exposed them to the virus.   Your involvement is important. The more we know about where and how this virus is spreading, the better chance we have at stopping it from spreading further.  What does exposure mean?   Exposure means you have been within 6 feet for more than 15 minutes with a person who has or had COVID-19.  What kind of questions do the contact tracers ask?   A contact tracer will confirm your basic contact information including name, address, phone number, and next of kin, as well as asking about any symptoms you may have had. Theyll also ask you how you think you may have gotten sick, such as places where you may have been exposed to the virus, and people you were with. Those names will never be shared with anyone outside of that call, and will only be used to help trace and stop the spread of the virus.   I have privacy concerns. How will the state use my information?   Your privacy about your health is important. All calls are completed using call centers that use the appropriate health privacy protection measures (HIPAA compliance), meaning that your patient information is safe. No one will ever ask you any questions related to immigration status. Your health comes first.   Do I have to participate?   You do not have to participate, but we strongly encourage you to. Contact tracing can help us catch and control new outbreaks as theyre developing to keep your friends and family safe.   What if I dont hear from anyone?   If you dont receive a call within 24 hours, you can call the number above right away to inquire about your status. That line is open from 8:00 am - 8:00 p.m., 7 days a  week.  Contact tracing saves lives! Together, we have the power to beat this virus and keep our loved ones and neighbors safe.       Instructions for household members, intimate partners and caregivers in a non-healthcare setting of a patient with confirmed or suspected COVID-19:         Close contacts should monitor their health and call their healthcare provider right away if they develop symptoms suggestive of COVID-19 (e.g., fever, cough, shortness of breath).    Stay home except to get medical care. Separate yourself from other people and animals in the home.   Monitor the patients symptoms. If the patient is getting sicker, call his or her healthcare provider. If the patient has a medical emergency and you need to call 911, notify the dispatch personnel that the patient has or is being evaluated for COVID-19.    Wear a facemask when around other people such as sharing a room or vehicle and before entering a healthcare provider's office.   Cover coughs and sneezes with a tissue. Throw used tissues in a lined trash can immediately and wash hands.   Clean hands often with soap and water for at least 20 seconds or with an alcohol-based hand , rubbing hands together until they feel dry. Avoid touching your eyes, nose, and mouth with unwashed hands.   Clean all high-touch; surfaces every day, including counters, tabletops, doorknobs, bathroom fixtures, toilets, phones, keyboards, tablets, bedside tables, etc. Use a household cleaning spray or wipe according to label instructions.   Avoid sharing personal household items such as dishes, drinking glasses, cups, towels, bedding, etc. After these items are used, they should be washed thoroughly with soap and water.   Continue isolation until:   At least 3 days (72 hours) have passed since recovery defined as resolution of fever without the use of fever-reducing medications and improvement in respiratory symptoms (e.g. cough, shortness of breath),  and    At least 10 days have passed since the patients first positive test.    https://www.cdc.gov/coronavirus/2019-ncov/your-health/index.htm

## 2020-09-30 ENCOUNTER — TELEPHONE (OUTPATIENT)
Dept: FAMILY MEDICINE | Facility: CLINIC | Age: 60
End: 2020-09-30

## 2020-09-30 LAB — SARS-COV-2 RNA RESP QL NAA+PROBE: NOT DETECTED

## 2020-09-30 NOTE — TELEPHONE ENCOUNTER
----- Message from Thelma Dumas sent at 9/30/2020 12:14 PM CDT -----  Pt called about his covid results please reach out to pt at [phone]

## 2020-10-27 DIAGNOSIS — I10 ESSENTIAL HYPERTENSION: ICD-10-CM

## 2020-10-27 NOTE — TELEPHONE ENCOUNTER
Care Due:                  Date            Visit Type   Department     Provider  --------------------------------------------------------------------------------                                ESTABLISHED                              PATIENT      [facility]  Last Visit: 01-      [facility]       Abad Rasmussen  Next Visit: None Scheduled  None         None Found                                                            Last  Test          Frequency    Reason                     Performed    Due Date  --------------------------------------------------------------------------------    Office Visit  12 months..  atorvastatin,              01- 01-                             escitalopram, omeprazole.    Powered by [employer]. Reference number: [id number]. 10/27/2020 12:06:33 AM   CDT

## 2020-10-28 RX ORDER — HYDROCHLOROTHIAZIDE 25 MG/1
TABLET ORAL
Qty: 90 TABLET | Refills: 0 | Status: SHIPPED | OUTPATIENT
Start: 2020-10-28 | End: 2020-12-06

## 2020-10-28 NOTE — PROGRESS NOTES
Refill Authorization Note   Rodo Luna is requesting a refill authorization.  Brief assessment and rationale for refill: Approve     Medication Therapy Plan: [facility]    Medication reconciliation completed: No   Comments:   Orders Placed This Encounter    hydroCHLOROthiazide (HYDRODIURIL) 25 MG tablet      Requested Prescriptions   Signed Prescriptions Disp Refills    hydroCHLOROthiazide (HYDRODIURIL) 25 MG tablet 90 tablet 0     Sig: TAKE 1 TABLET BY MOUTH EVERY DAY       Cardiovascular: Diuretics - Thiazide Passed - 10/27/2020 12:06 AM        Passed - Patient is at least 18 years old        Passed - Last BP in normal range within 360 days.     BP Readings from Last 3 Encounters:   09/29/20 137/73   09/03/20 (!) 148/94   08/04/20 128/70              Passed - Office visit in past 12 months or future 90 days     Recent Outpatient Visits            4 weeks ago Suspected Covid-19 Virus Infection    [facility]    1 month ago Prostate cancer    [facility] Roxanna Biggs MD    2 months ago Acute non-suppurative otitis media, right    [facility] Suzette Matthews NP    2 months ago Sinusitis, unspecified chronicity, unspecified location    [facility] Marifer Cordoba NP    9 months ago Anxiety and depression    [facility] Abad Rasmussen MD          Future Appointments              In 2 months [facility], [facility]                Passed - Cr is 1.4 or below and within 360 days     Creatinine   Date Value Ref Range Status   01/08/2020 1.0 0.5 - 1.4 mg/dL Final   01/25/2019 1.1 0.5 - 1.4 mg/dL Final   10/20/2017 1.2 0.5 - 1.4 mg/dL Final   10/18/2012 1.0 0.5 - 1.4 mg/dL Final   06/01/2012 1.1 0.2 - 1.4 mg/dl Final              Passed - K in normal range and within 360 days     Potassium   Date Value Ref Range Status   01/08/2020 3.5 3.5 - 5.1 mmol/L Final   01/25/2019 4.1 3.5 - 5.1 mmol/L Final   10/20/2017 4.1  3.5 - 5.1 mmol/L Final              Passed - Na is between 130 and 148 and within 360 days     Sodium   Date Value Ref Range Status   01/08/2020 140 136 - 145 mmol/L Final   01/25/2019 138 136 - 145 mmol/L Final   10/20/2017 140 136 - 145 mmol/L Final              Passed - eGFR within 360 days     Glom Filt Rate, Est    Date Value Ref Range Status   10/18/2012 >60 >60 Final   06/01/2012 >60 >60 Final     Glom filt Rate, Est non-   Date Value Ref Range Status   10/18/2012 >60 >60 Final     Comment:     eGFR INTERPRETATION: THE eGFR VALUES ARE CALCULATED USING THE ISOTOPE  DILUTION MASS SPECTROMETRY (IDMS)-TRACEABLE MODIFICATION OF DIET IN RENAL  DISEASE (MDRD) STUDY EQUIATION. VALUES ARE RACE, SEX AND AGE DEPENDENT AND  UNITS ARE ml/min/1.732 m2. IN THE FOLLOWING CLINICAL SETTINGS AN ACTUAL  CLEARANCE MEASUREMENT MAY BE REQUIRED: EXTREMES OF AGE OR BODY SIZE,  SEVERE MALNUTRITION OR OBESITY, DISEASES OF SKELETAL MUSCLE, PARAPLEGIA OR  QUADRAPLAGIA, STRICT VEGETARIAN DIET, RAPIDLY CHANGING KIDNEY FUNCTION OR  PRIOR TO DOSING DRUGS WITH SIGNIFICANT TOXICITY THAT ARE EXCRETED BY THE  KIDNEY. DECREASED eGFR FOR >3 MONTHS TO LEVELS OF 30-59 IS CLASSIFIED BY  THE [employer] AS CHRONIC RENAL DISEASE, STAGE 3. 15-29  IS CLASSIFIED AS STAGE 4.   06/01/2012 >60 >60 Final     Comment:     eGFR INTERPRETATION: THE eGFR VALUES ARE CALCULATED USING THE ISOTOPE  DILUTION MASS SPECTROMETRY (IDMS)-TRACEABLE MODIFICATION OF DIET IN RENAL  DISEASE (MDRD) STUDY EQUIATION. VALUES ARE RACE, SEX AND AGE DEPENDENT AND  UNITS ARE ml/min/1.732 m2. IN THE FOLLOWING CLINICAL SETTINGS AN ACTUAL  CLEARANCE MEASUREMENT MAY BE REQUIRED: EXTREMES OF AGE OR BODY SIZE,  SEVERE MALNUTRITION OR OBESITY, DISEASES OF SKELETAL MUSCLE, PARAPLEGIA OR  QUADRAPLAGIA, STRICT VEGETARIAN DIET, RAPIDLY CHANGING KIDNEY FUNCTION OR  PRIOR TO DOSING DRUGS WITH SIGNIFICANT TOXICITY THAT ARE EXCRETED BY THE  KIDNEY.  DECREASED eGFR FOR >3 MONTHS TO LEVELS OF 30-59 IS CLASSIFIED BY  THE [facility] [facility] AS CHRONIC RENAL DISEASE, STAGE 3. 15-29  IS CLASSIFIED AS STAGE 4.     eGFR if non    Date Value Ref Range Status   01/08/2020 >60.0 >60 mL/min/1.73 m^2 Final     Comment:     Calculation used to obtain the estimated glomerular filtration  rate (eGFR) is the CKD-EPI equation.      01/25/2019 >60.0 >60 mL/min/1.73 m^2 Final     Comment:     Calculation used to obtain the estimated glomerular filtration  rate (eGFR) is the CKD-EPI equation.      10/20/2017 >60.0 >60 mL/min/1.73 m^2 Final     Comment:     Calculation used to obtain the estimated glomerular filtration  rate (eGFR) is the CKD-EPI equation. Since race is unknown   in our information system, the eGFR values for   -American and Non--American patients are given   for each creatinine result.       eGFR if    Date Value Ref Range Status   01/08/2020 >60.0 >60 mL/min/1.73 m^2 Final   01/25/2019 >60.0 >60 mL/min/1.73 m^2 Final   10/20/2017 >60.0 >60 mL/min/1.73 m^2 Final                  Appointments  past 12m or future 3m with PCP    Date Provider   Last Visit   1/8/2020 Abad Rasmussen MD   Next Visit   Visit date not found Abad Rasmussen MD   ED visits in past 90 days: 0     Note composed:2:46 PM 10/28/2020

## 2020-11-06 ENCOUNTER — TELEPHONE (OUTPATIENT)
Dept: OTOLARYNGOLOGY | Facility: CLINIC | Age: 60
End: 2020-11-06

## 2020-11-06 NOTE — TELEPHONE ENCOUNTER
----- Message from Sherry Resendiz sent at 11/6/2020  7:56 AM CST -----  Type: Needs Medical Advice  Who Called:  Patient   Best Call Back Number:    Additional Information: Per patient requesting a call back to discuss schedule surgery to have tubes put in-please advise-thank you

## 2020-11-06 NOTE — TELEPHONE ENCOUNTER
Attempted to contact pt 3x, pt hung up on me twice and the call went to recording saying no vm set up.

## 2020-11-06 NOTE — TELEPHONE ENCOUNTER
----- Message from Rosa Leon sent at 11/6/2020  8:32 AM CST -----  Contact: pt  Pt states that he missed a call from the office  and he is calling back. Looks like Nurse Deleon had tried to reach the pt....[phone]

## 2020-11-20 ENCOUNTER — OFFICE VISIT (OUTPATIENT)
Dept: OTOLARYNGOLOGY | Facility: CLINIC | Age: 60
End: 2020-11-20
Payer: COMMERCIAL

## 2020-11-20 VITALS
OXYGEN SATURATION: 97 % | HEART RATE: 71 BPM | SYSTOLIC BLOOD PRESSURE: 142 MMHG | WEIGHT: 268.94 LBS | HEIGHT: 73 IN | BODY MASS INDEX: 35.64 KG/M2 | DIASTOLIC BLOOD PRESSURE: 83 MMHG

## 2020-11-20 DIAGNOSIS — H65.21 CHRONIC SEROUS OTITIS MEDIA, RIGHT EAR: Primary | ICD-10-CM

## 2020-11-20 PROCEDURE — 3077F PR MOST RECENT SYSTOLIC BLOOD PRESSURE >= 140 MM HG: ICD-10-PCS | Mod: CPTII,S$GLB,, | Performed by: OTOLARYNGOLOGY

## 2020-11-20 PROCEDURE — 3079F PR MOST RECENT DIASTOLIC BLOOD PRESSURE 80-89 MM HG: ICD-10-PCS | Mod: CPTII,S$GLB,, | Performed by: OTOLARYNGOLOGY

## 2020-11-20 PROCEDURE — 3079F DIAST BP 80-89 MM HG: CPT | Mod: CPTII,S$GLB,, | Performed by: OTOLARYNGOLOGY

## 2020-11-20 PROCEDURE — 3008F PR BODY MASS INDEX (BMI) DOCUMENTED: ICD-10-PCS | Mod: CPTII,S$GLB,, | Performed by: OTOLARYNGOLOGY

## 2020-11-20 PROCEDURE — 99213 OFFICE O/P EST LOW 20 MIN: CPT | Mod: S$GLB,,, | Performed by: OTOLARYNGOLOGY

## 2020-11-20 PROCEDURE — 1125F AMNT PAIN NOTED PAIN PRSNT: CPT | Mod: S$GLB,,, | Performed by: OTOLARYNGOLOGY

## 2020-11-20 PROCEDURE — 99213 PR OFFICE/OUTPT VISIT, EST, LEVL III, 20-29 MIN: ICD-10-PCS | Mod: S$GLB,,, | Performed by: OTOLARYNGOLOGY

## 2020-11-20 PROCEDURE — 3008F BODY MASS INDEX DOCD: CPT | Mod: CPTII,S$GLB,, | Performed by: OTOLARYNGOLOGY

## 2020-11-20 PROCEDURE — 3077F SYST BP >= 140 MM HG: CPT | Mod: CPTII,S$GLB,, | Performed by: OTOLARYNGOLOGY

## 2020-11-20 PROCEDURE — 1125F PR PAIN SEVERITY QUANTIFIED, PAIN PRESENT: ICD-10-PCS | Mod: S$GLB,,, | Performed by: OTOLARYNGOLOGY

## 2020-11-20 RX ORDER — CHLORHEXIDINE GLUCONATE ORAL RINSE 1.2 MG/ML
SOLUTION DENTAL
COMMUNITY
Start: 2020-09-03 | End: 2021-10-22 | Stop reason: SDUPTHER

## 2020-11-20 NOTE — PROGRESS NOTES
Subjective:       Patient ID: Rodo Luna is a 60 y.o. male.    Chief Complaint: Establish Care (discuss tubes)    HPI     Rodo Luna is a 59 yo man presenting with complaint of muffled hearing and right-sided ear pain. He has had this problem for 5 months. Has undergone multiple rounds of antibiotics and steroids. Saw Suzette Matthews in [place] in August of this year who recommended observation. Was told to call back if symptoms persisted. He works offshore. Called last week to set up appointment, appointment was made in [place] (with primary care?). Today he presents very upset. He feels he needs treatment immediately for his ear. Hearing is muffled on that side.    Review of Systems   Constitutional: Negative for activity change, appetite change, fatigue, fever and unexpected weight change.   HENT: Positive for hearing loss. Negative for nasal congestion, dental problem, ear discharge, ear pain, facial swelling, nosebleeds, postnasal drip, rhinorrhea, sinus pressure/congestion, sneezing, sore throat, trouble swallowing and voice change.    Eyes: Negative for photophobia, pain and itching.   Respiratory: Negative for apnea, cough, shortness of breath, wheezing and stridor.    Cardiovascular: Negative for chest pain and palpitations.   Gastrointestinal: Negative for abdominal pain, diarrhea, nausea and vomiting.   Endocrine: Negative for cold intolerance and heat intolerance.   Genitourinary: Negative for bladder incontinence and dysuria.   Musculoskeletal: Negative for arthralgias, myalgias, neck pain and neck stiffness.   Integumentary:  Negative for pallor, rash and mole/lesion.   Allergic/Immunologic: Negative for food allergies.   Neurological: Negative for dizziness, vertigo, seizures, syncope and headaches.   Psychiatric/Behavioral: Negative for behavioral problems and confusion.         Objective:      Physical Exam  Constitutional:       General: He is awake.      Appearance: Normal appearance. He  is well-developed.   HENT:      Head: Normocephalic and atraumatic.      Right Ear: Ear canal and external ear normal.      Left Ear: Tympanic membrane, ear canal and external ear normal.      Ears:        Nose: Nose normal. No septal deviation.      Mouth/Throat:      Pharynx: Uvula midline.   Eyes:      General: Lids are normal. Vision grossly intact.      Conjunctiva/sclera: Conjunctivae normal.   Neck:      Musculoskeletal: Full passive range of motion without pain, normal range of motion and neck supple.      Thyroid: No thyroid mass or thyromegaly.   Pulmonary:      Effort: Pulmonary effort is normal. No tachypnea or respiratory distress.   Skin:     General: Skin is warm and dry.   Neurological:      Mental Status: He is alert and oriented to person, place, and time.   Psychiatric:         Mood and Affect: Mood and affect normal.         Assessment:       1. Chronic serous otitis media, right ear        Plan:       Unfortunately I do not have the capability of in-office ear tubes at this time. The patient was very frustrated. I recommended he make appointment with [facility] office to set up tubes being placed. He will also need a nasopharyngoscopy given unilateral effusion

## 2020-11-23 ENCOUNTER — TELEPHONE (OUTPATIENT)
Dept: FAMILY MEDICINE | Facility: CLINIC | Age: 60
End: 2020-11-23

## 2020-11-23 DIAGNOSIS — Z91.89 PNEUMOCOCCAL VACCINATION INDICATED: Primary | ICD-10-CM

## 2020-11-23 DIAGNOSIS — Z23 IMMUNIZATION DUE: Primary | ICD-10-CM

## 2020-11-23 NOTE — TELEPHONE ENCOUNTER
----- Message from Jerry Aponte sent at 11/23/2020 10:42 AM CST -----  Type: Needs Medical Advice  Who Called:  Patient    Best Call Back Number:[phone]  Additional Information:  Patient states that he would like a callback regarding scheduling a Nurse Visit for his flu shot and pneumonia shot.

## 2020-11-23 NOTE — TELEPHONE ENCOUNTER
Yes, he is due for a Pneumovax 23.  I presume it is due to his prostate cancer history.  Order is in

## 2020-11-25 ENCOUNTER — CLINICAL SUPPORT (OUTPATIENT)
Dept: INTERNAL MEDICINE | Facility: CLINIC | Age: 60
End: 2020-11-25
Payer: COMMERCIAL

## 2020-11-25 VITALS — TEMPERATURE: 98 F

## 2020-11-25 DIAGNOSIS — Z91.89 PNEUMOCOCCAL VACCINATION INDICATED: ICD-10-CM

## 2020-11-25 DIAGNOSIS — Z23 IMMUNIZATION DUE: ICD-10-CM

## 2020-11-25 PROCEDURE — 90732 PNEUMOCOCCAL POLYSACCHARIDE VACCINE 23-VALENT =>2YO SQ IM: ICD-10-PCS | Mod: S$GLB,,, | Performed by: FAMILY MEDICINE

## 2020-11-25 PROCEDURE — 90686 FLU VACCINE (QUAD) GREATER THAN OR EQUAL TO 3YO PRESERVATIVE FREE IM: ICD-10-PCS | Mod: S$GLB,,, | Performed by: FAMILY MEDICINE

## 2020-11-25 PROCEDURE — 99999 PR PBB SHADOW E&M-EST. PATIENT-LVL I: CPT | Mod: PBBFAC,,,

## 2020-11-25 PROCEDURE — 90472 IMMUNIZATION ADMIN EACH ADD: CPT | Mod: S$GLB,,, | Performed by: FAMILY MEDICINE

## 2020-11-25 PROCEDURE — 99999 PR PBB SHADOW E&M-EST. PATIENT-LVL I: ICD-10-PCS | Mod: PBBFAC,,,

## 2020-11-25 PROCEDURE — 90471 FLU VACCINE (QUAD) GREATER THAN OR EQUAL TO 3YO PRESERVATIVE FREE IM: ICD-10-PCS | Mod: S$GLB,,, | Performed by: FAMILY MEDICINE

## 2020-11-25 PROCEDURE — 90471 IMMUNIZATION ADMIN: CPT | Mod: S$GLB,,, | Performed by: FAMILY MEDICINE

## 2020-11-25 PROCEDURE — 90732 PPSV23 VACC 2 YRS+ SUBQ/IM: CPT | Mod: S$GLB,,, | Performed by: FAMILY MEDICINE

## 2020-11-25 PROCEDURE — 90472 PNEUMOCOCCAL POLYSACCHARIDE VACCINE 23-VALENT =>2YO SQ IM: ICD-10-PCS | Mod: S$GLB,,, | Performed by: FAMILY MEDICINE

## 2020-11-25 PROCEDURE — 90686 IIV4 VACC NO PRSV 0.5 ML IM: CPT | Mod: S$GLB,,, | Performed by: FAMILY MEDICINE

## 2020-11-25 NOTE — PROGRESS NOTES
Two person identification name, d.o.b with verbal feedback.  Aseptic technique used.  Administration Pneumovax 23, vaccine to the  L Deltoid, Influenza RA IM given.  Tolerated well.  waited 15 min.  VIS 4/24/15 given.

## 2020-11-30 ENCOUNTER — PROCEDURE VISIT (OUTPATIENT)
Dept: OTOLARYNGOLOGY | Facility: CLINIC | Age: 60
End: 2020-11-30
Payer: COMMERCIAL

## 2020-11-30 VITALS — HEIGHT: 73 IN | BODY MASS INDEX: 35.85 KG/M2 | WEIGHT: 270.5 LBS

## 2020-11-30 DIAGNOSIS — H65.21 CHRONIC SEROUS OTITIS MEDIA, RIGHT EAR: Primary | ICD-10-CM

## 2020-11-30 PROCEDURE — 69433 PR CREATE EARDRUM OPENING,LOCAL ANESTH: ICD-10-PCS | Mod: S$GLB,,, | Performed by: OTOLARYNGOLOGY

## 2020-11-30 PROCEDURE — 31231 PR NASAL ENDOSCOPY, DX: ICD-10-PCS | Mod: 51,S$GLB,, | Performed by: OTOLARYNGOLOGY

## 2020-11-30 PROCEDURE — 69433 CREATE EARDRUM OPENING: CPT | Mod: S$GLB,,, | Performed by: OTOLARYNGOLOGY

## 2020-11-30 PROCEDURE — 31231 NASAL ENDOSCOPY DX: CPT | Mod: 51,S$GLB,, | Performed by: OTOLARYNGOLOGY

## 2020-11-30 RX ORDER — OFLOXACIN 3 MG/ML
5 SOLUTION AURICULAR (OTIC) 2 TIMES DAILY
Qty: 10 ML | Refills: 3 | Status: SHIPPED | OUTPATIENT
Start: 2020-11-30 | End: 2020-12-03

## 2020-11-30 NOTE — PROGRESS NOTES
Patient has been seen in our department for chronic right serous otitis media.  No previous history of eustachian tube dysfunction or tubes.  This began following an episode of rhinitis related to [place] dust. Has right sided nasal congestion and right ear fullness. No epistaxis. Tube indicated for chronicity and scope indicated for unilateral effusion.     Rodo Luna    [id number],   : 1960      Procedure date:2020  Patient's medications, allergies, past medical, surgical, social and family histories were reviewed and updated as appropriate.    Pre-procedure diagnosis: No primary diagnosis found.     Procedure: right  tympanostomy, with ventilation tube placement     Procedure in detail: Risks, benefits, and alternatives of the procedure were discussed with the patient, and consent was obtained to perform for the right ear.  The procedure required a high level of expertise and use of an operating microscope and multiple micro-instruments.     With the patient in the supine position, the operating microscope was used to examine the ear(s)  A variety of sterile, micro-instruments were utilized to remove the cerumen atraumatically. I applied a small amount of phenol topically (local analgesic) to the tympanic membrane.  The tympanic membrane was incised radially in the appropriate location.  I suctioned the middle ear through the incision. A ventilation tube was placed (position & patency confirmed).  I performed the procedure. The patient tolerated the procedure well and there were no complications.    Findings:   Right ear : serous effusion        Name: Rodo Luna     Pre-procedure diagnosis: Chronic serous otitis media, right ear [H65.21]  Post-procedure diagnosis: Same    Procedure: Bilateral nasal endoscopy  Anesthesia:  2% Lidocaine and Phenylephrine  Topical    Indication: This procedure is indicated as anterior rhinoscopy is not sufficient to account for all of the patients symptoms.      Procedure: Risks, benefits, and alternatives of the procedure were discussed with the patient, and consent was obtained to perform a nasal endoscopy.  The nasal cavity was sprayed with a topical decongestant and topical anesthetic. After adequate anesthesia was obtained, the scope was passed into each nostril independently.  The nasal cavities, nasopharynx, choana, eustachian tube, fossa of Rosenmüller, and adenoids were examined with the findings described below. At the end of the examination, the scope was removed. The patient tolerated the procedure well with no complications.     Findings:  -     Nasal septum: LEFT mod deviation   -     Inferior turbinate: - normal mucosa without significant hypertrophy - bilaterally  -     Middle meatus / Middle Turbinate: LEFT: no purulence, no obstruction, no polyps  RIGHT: no purulence, no obstruction, no polyps  -     Sphenoethmoidal recess / Superior Turbinate LEFT: no purulence, no obstruction, no polyps RIGHT no purulence, no obstruction, no polyps  -     Adenoids have mild central hypertrophy  -     There is no stenosis of the choana,  eustachian tube, or nasopharynx  -     Fossa of Rosenmüller is relatively symmetric. The antwan are mildly edematous with erythema overlying. The right is more prominent than the left, but no discrete mass  -     No other findings       Floxin x 3 days given [place].   Recheck nasopharynx 4 mos. Likely mild prominence but monitor  Tube check 1 year

## 2020-11-30 NOTE — PATIENT INSTRUCTIONS
OK to return to work full duty    Rodo was under my direct care from 11/20 until now when he got an appointment

## 2020-12-30 ENCOUNTER — LAB VISIT (OUTPATIENT)
Dept: LAB | Facility: HOSPITAL | Age: 60
End: 2020-12-30
Attending: UROLOGY
Payer: COMMERCIAL

## 2020-12-30 DIAGNOSIS — C61 PROSTATE CANCER: ICD-10-CM

## 2020-12-30 LAB — COMPLEXED PSA SERPL-MCNC: 0.03 NG/ML (ref 0–4)

## 2020-12-30 PROCEDURE — 84153 ASSAY OF PSA TOTAL: CPT

## 2020-12-30 PROCEDURE — 36415 COLL VENOUS BLD VENIPUNCTURE: CPT

## 2021-01-22 ENCOUNTER — PATIENT MESSAGE (OUTPATIENT)
Dept: FAMILY MEDICINE | Facility: CLINIC | Age: 61
End: 2021-01-22

## 2021-03-05 ENCOUNTER — IMMUNIZATION (OUTPATIENT)
Dept: FAMILY MEDICINE | Facility: CLINIC | Age: 61
End: 2021-03-05
Payer: COMMERCIAL

## 2021-03-05 DIAGNOSIS — Z23 NEED FOR VACCINATION: Primary | ICD-10-CM

## 2021-03-05 PROCEDURE — 91300 COVID-19, MRNA, LNP-S, PF, 30 MCG/0.3 ML DOSE VACCINE: ICD-10-PCS | Mod: ,,, | Performed by: FAMILY MEDICINE

## 2021-03-05 PROCEDURE — 91300 COVID-19, MRNA, LNP-S, PF, 30 MCG/0.3 ML DOSE VACCINE: CPT | Mod: ,,, | Performed by: FAMILY MEDICINE

## 2021-03-05 PROCEDURE — 0001A COVID-19, MRNA, LNP-S, PF, 30 MCG/0.3 ML DOSE VACCINE: ICD-10-PCS | Mod: CV19,,, | Performed by: FAMILY MEDICINE

## 2021-03-05 PROCEDURE — 0001A COVID-19, MRNA, LNP-S, PF, 30 MCG/0.3 ML DOSE VACCINE: CPT | Mod: CV19,,, | Performed by: FAMILY MEDICINE

## 2021-04-08 ENCOUNTER — TELEPHONE (OUTPATIENT)
Dept: FAMILY MEDICINE | Facility: CLINIC | Age: 61
End: 2021-04-08

## 2021-04-09 ENCOUNTER — OFFICE VISIT (OUTPATIENT)
Dept: FAMILY MEDICINE | Facility: CLINIC | Age: 61
End: 2021-04-09
Payer: COMMERCIAL

## 2021-04-09 ENCOUNTER — IMMUNIZATION (OUTPATIENT)
Dept: FAMILY MEDICINE | Facility: CLINIC | Age: 61
End: 2021-04-09
Payer: COMMERCIAL

## 2021-04-09 ENCOUNTER — HOSPITAL ENCOUNTER (OUTPATIENT)
Dept: RADIOLOGY | Facility: CLINIC | Age: 61
Discharge: HOME | End: 2021-04-09
Attending: PHYSICIAN ASSISTANT
Payer: COMMERCIAL

## 2021-04-09 VITALS
TEMPERATURE: 98 F | HEIGHT: 73 IN | DIASTOLIC BLOOD PRESSURE: 86 MMHG | HEART RATE: 67 BPM | WEIGHT: 275.81 LBS | BODY MASS INDEX: 36.56 KG/M2 | SYSTOLIC BLOOD PRESSURE: 138 MMHG | OXYGEN SATURATION: 97 %

## 2021-04-09 DIAGNOSIS — Z01.818 PRE-OP EXAMINATION: ICD-10-CM

## 2021-04-09 DIAGNOSIS — E66.9 OBESITY (BMI 35.0-39.9 WITHOUT COMORBIDITY): ICD-10-CM

## 2021-04-09 DIAGNOSIS — Z23 NEED FOR VACCINATION: Primary | ICD-10-CM

## 2021-04-09 DIAGNOSIS — Z01.818 PRE-OP EXAMINATION: Primary | ICD-10-CM

## 2021-04-09 DIAGNOSIS — C61 PRIMARY ADENOCARCINOMA OF PROSTATE: ICD-10-CM

## 2021-04-09 DIAGNOSIS — I10 ESSENTIAL HYPERTENSION: ICD-10-CM

## 2021-04-09 DIAGNOSIS — E78.2 MIXED HYPERLIPIDEMIA: ICD-10-CM

## 2021-04-09 PROCEDURE — 93010 ELECTROCARDIOGRAM REPORT: CPT | Mod: S$GLB,,, | Performed by: INTERNAL MEDICINE

## 2021-04-09 PROCEDURE — 99214 OFFICE O/P EST MOD 30 MIN: CPT | Mod: S$GLB,,, | Performed by: PHYSICIAN ASSISTANT

## 2021-04-09 PROCEDURE — 93005 EKG 12-LEAD: ICD-10-PCS | Mod: S$GLB,,, | Performed by: PHYSICIAN ASSISTANT

## 2021-04-09 PROCEDURE — 71046 XR CHEST PA AND LATERAL: ICD-10-PCS | Mod: 26,,, | Performed by: RADIOLOGY

## 2021-04-09 PROCEDURE — 91300 COVID-19, MRNA, LNP-S, PF, 30 MCG/0.3 ML DOSE VACCINE: ICD-10-PCS | Mod: S$GLB,,, | Performed by: FAMILY MEDICINE

## 2021-04-09 PROCEDURE — 71046 X-RAY EXAM CHEST 2 VIEWS: CPT | Mod: TC,FY,PO

## 2021-04-09 PROCEDURE — 71046 X-RAY EXAM CHEST 2 VIEWS: CPT | Mod: 26,,, | Performed by: RADIOLOGY

## 2021-04-09 PROCEDURE — 1126F PR PAIN SEVERITY QUANTIFIED, NO PAIN PRESENT: ICD-10-PCS | Mod: S$GLB,,, | Performed by: PHYSICIAN ASSISTANT

## 2021-04-09 PROCEDURE — 3008F PR BODY MASS INDEX (BMI) DOCUMENTED: ICD-10-PCS | Mod: CPTII,S$GLB,, | Performed by: PHYSICIAN ASSISTANT

## 2021-04-09 PROCEDURE — 99999 PR PBB SHADOW E&M-EST. PATIENT-LVL V: CPT | Mod: PBBFAC,,, | Performed by: PHYSICIAN ASSISTANT

## 2021-04-09 PROCEDURE — 0002A COVID-19, MRNA, LNP-S, PF, 30 MCG/0.3 ML DOSE VACCINE: ICD-10-PCS | Mod: CV19,S$GLB,, | Performed by: FAMILY MEDICINE

## 2021-04-09 PROCEDURE — 93010 EKG 12-LEAD: ICD-10-PCS | Mod: S$GLB,,, | Performed by: INTERNAL MEDICINE

## 2021-04-09 PROCEDURE — 99214 PR OFFICE/OUTPT VISIT, EST, LEVL IV, 30-39 MIN: ICD-10-PCS | Mod: S$GLB,,, | Performed by: PHYSICIAN ASSISTANT

## 2021-04-09 PROCEDURE — 1126F AMNT PAIN NOTED NONE PRSNT: CPT | Mod: S$GLB,,, | Performed by: PHYSICIAN ASSISTANT

## 2021-04-09 PROCEDURE — 3008F BODY MASS INDEX DOCD: CPT | Mod: CPTII,S$GLB,, | Performed by: PHYSICIAN ASSISTANT

## 2021-04-09 PROCEDURE — 91300 COVID-19, MRNA, LNP-S, PF, 30 MCG/0.3 ML DOSE VACCINE: CPT | Mod: S$GLB,,, | Performed by: FAMILY MEDICINE

## 2021-04-09 PROCEDURE — 0002A COVID-19, MRNA, LNP-S, PF, 30 MCG/0.3 ML DOSE VACCINE: CPT | Mod: CV19,S$GLB,, | Performed by: FAMILY MEDICINE

## 2021-04-09 PROCEDURE — 93005 ELECTROCARDIOGRAM TRACING: CPT | Mod: S$GLB,,, | Performed by: PHYSICIAN ASSISTANT

## 2021-04-09 PROCEDURE — 99999 PR PBB SHADOW E&M-EST. PATIENT-LVL V: ICD-10-PCS | Mod: PBBFAC,,, | Performed by: PHYSICIAN ASSISTANT

## 2021-04-09 RX ORDER — OFLOXACIN 3 MG/ML
SOLUTION/ DROPS OPHTHALMIC
COMMUNITY
Start: 2021-04-08 | End: 2022-11-18

## 2021-04-09 RX ORDER — PREDNISOLONE ACETATE 10 MG/ML
SUSPENSION/ DROPS OPHTHALMIC
COMMUNITY
Start: 2021-04-08 | End: 2022-11-18

## 2021-04-10 ENCOUNTER — LAB VISIT (OUTPATIENT)
Dept: LAB | Facility: HOSPITAL | Age: 61
End: 2021-04-10
Attending: PHYSICIAN ASSISTANT
Payer: COMMERCIAL

## 2021-04-10 DIAGNOSIS — I10 ESSENTIAL HYPERTENSION: ICD-10-CM

## 2021-04-10 DIAGNOSIS — Z01.818 PRE-OP EXAMINATION: ICD-10-CM

## 2021-04-10 DIAGNOSIS — E78.2 MIXED HYPERLIPIDEMIA: ICD-10-CM

## 2021-04-10 DIAGNOSIS — C61 PRIMARY ADENOCARCINOMA OF PROSTATE: ICD-10-CM

## 2021-04-10 DIAGNOSIS — E66.9 OBESITY (BMI 35.0-39.9 WITHOUT COMORBIDITY): ICD-10-CM

## 2021-04-10 LAB
ALBUMIN SERPL BCP-MCNC: 4 G/DL (ref 3.5–5.2)
ALP SERPL-CCNC: 61 U/L (ref 55–135)
ALT SERPL W/O P-5'-P-CCNC: 21 U/L (ref 10–44)
ANION GAP SERPL CALC-SCNC: 8 MMOL/L (ref 8–16)
AST SERPL-CCNC: 15 U/L (ref 10–40)
BASOPHILS # BLD AUTO: 0.1 K/UL (ref 0–0.2)
BASOPHILS NFR BLD: 1.4 % (ref 0–1.9)
BILIRUB SERPL-MCNC: 0.4 MG/DL (ref 0.1–1)
BUN SERPL-MCNC: 15 MG/DL (ref 6–20)
CALCIUM SERPL-MCNC: 9.2 MG/DL (ref 8.7–10.5)
CHLORIDE SERPL-SCNC: 104 MMOL/L (ref 95–110)
CHOLEST SERPL-MCNC: 197 MG/DL (ref 120–199)
CHOLEST/HDLC SERPL: 3.3 {RATIO} (ref 2–5)
CO2 SERPL-SCNC: 28 MMOL/L (ref 23–29)
CREAT SERPL-MCNC: 1.2 MG/DL (ref 0.5–1.4)
DIFFERENTIAL METHOD: NORMAL
EOSINOPHIL # BLD AUTO: 0.2 K/UL (ref 0–0.5)
EOSINOPHIL NFR BLD: 2.5 % (ref 0–8)
ERYTHROCYTE [DISTWIDTH] IN BLOOD BY AUTOMATED COUNT: 12.8 % (ref 11.5–14.5)
EST. GFR  (AFRICAN AMERICAN): >60 ML/MIN/1.73 M^2
EST. GFR  (NON AFRICAN AMERICAN): >60 ML/MIN/1.73 M^2
GLUCOSE SERPL-MCNC: 113 MG/DL (ref 70–110)
HCT VFR BLD AUTO: 45.3 % (ref 40–54)
HDLC SERPL-MCNC: 60 MG/DL (ref 40–75)
HDLC SERPL: 30.5 % (ref 20–50)
HGB BLD-MCNC: 15.2 G/DL (ref 14–18)
IMM GRANULOCYTES # BLD AUTO: 0.02 K/UL (ref 0–0.04)
IMM GRANULOCYTES NFR BLD AUTO: 0.3 % (ref 0–0.5)
LDLC SERPL CALC-MCNC: 117.6 MG/DL (ref 63–159)
LYMPHOCYTES # BLD AUTO: 1.7 K/UL (ref 1–4.8)
LYMPHOCYTES NFR BLD: 23.9 % (ref 18–48)
MCH RBC QN AUTO: 29.7 PG (ref 27–31)
MCHC RBC AUTO-ENTMCNC: 33.6 G/DL (ref 32–36)
MCV RBC AUTO: 89 FL (ref 82–98)
MONOCYTES # BLD AUTO: 1 K/UL (ref 0.3–1)
MONOCYTES NFR BLD: 13.1 % (ref 4–15)
NEUTROPHILS # BLD AUTO: 4.3 K/UL (ref 1.8–7.7)
NEUTROPHILS NFR BLD: 58.8 % (ref 38–73)
NONHDLC SERPL-MCNC: 137 MG/DL
NRBC BLD-RTO: 0 /100 WBC
PLATELET # BLD AUTO: 214 K/UL (ref 150–450)
PMV BLD AUTO: 9.9 FL (ref 9.2–12.9)
POTASSIUM SERPL-SCNC: 3.8 MMOL/L (ref 3.5–5.1)
PROT SERPL-MCNC: 7.3 G/DL (ref 6–8.4)
RBC # BLD AUTO: 5.11 M/UL (ref 4.6–6.2)
SODIUM SERPL-SCNC: 140 MMOL/L (ref 136–145)
TRIGL SERPL-MCNC: 97 MG/DL (ref 30–150)
TSH SERPL DL<=0.005 MIU/L-ACNC: 1.2 UIU/ML (ref 0.4–4)
WBC # BLD AUTO: 7.23 K/UL (ref 3.9–12.7)

## 2021-04-10 PROCEDURE — 80053 COMPREHEN METABOLIC PANEL: CPT | Performed by: PHYSICIAN ASSISTANT

## 2021-04-10 PROCEDURE — 85025 COMPLETE CBC W/AUTO DIFF WBC: CPT | Performed by: PHYSICIAN ASSISTANT

## 2021-04-10 PROCEDURE — 84443 ASSAY THYROID STIM HORMONE: CPT | Performed by: PHYSICIAN ASSISTANT

## 2021-04-10 PROCEDURE — 80061 LIPID PANEL: CPT | Performed by: PHYSICIAN ASSISTANT

## 2021-04-10 PROCEDURE — 36415 COLL VENOUS BLD VENIPUNCTURE: CPT | Performed by: PHYSICIAN ASSISTANT

## 2021-05-10 DIAGNOSIS — K21.9 GASTROESOPHAGEAL REFLUX DISEASE WITHOUT ESOPHAGITIS: ICD-10-CM

## 2021-05-10 RX ORDER — OMEPRAZOLE 40 MG/1
40 CAPSULE, DELAYED RELEASE ORAL EVERY MORNING
Qty: 90 CAPSULE | Refills: 1 | Status: CANCELLED | OUTPATIENT
Start: 2021-05-10

## 2021-05-11 DIAGNOSIS — Z23 IMMUNIZATION DUE: Primary | ICD-10-CM

## 2021-05-11 RX ORDER — OMEPRAZOLE 20 MG/1
20 CAPSULE, DELAYED RELEASE ORAL DAILY
Qty: 90 CAPSULE | Refills: 3 | Status: SHIPPED | OUTPATIENT
Start: 2021-05-11 | End: 2022-03-01

## 2021-05-12 ENCOUNTER — CLINICAL SUPPORT (OUTPATIENT)
Dept: INTERNAL MEDICINE | Facility: CLINIC | Age: 61
End: 2021-05-12
Payer: COMMERCIAL

## 2021-05-12 DIAGNOSIS — Z23 ENCOUNTER FOR IMMUNIZATION: ICD-10-CM

## 2021-05-12 PROCEDURE — 90471 IMMUNIZATION ADMIN: CPT | Mod: S$GLB,,, | Performed by: FAMILY MEDICINE

## 2021-05-12 PROCEDURE — 90750 ZOSTER RECOMBINANT VACCINE: ICD-10-PCS | Mod: S$GLB,,, | Performed by: FAMILY MEDICINE

## 2021-05-12 PROCEDURE — 90471 ZOSTER RECOMBINANT VACCINE: ICD-10-PCS | Mod: S$GLB,,, | Performed by: FAMILY MEDICINE

## 2021-05-12 PROCEDURE — 90750 HZV VACC RECOMBINANT IM: CPT | Mod: S$GLB,,, | Performed by: FAMILY MEDICINE

## 2021-06-22 ENCOUNTER — TELEPHONE (OUTPATIENT)
Dept: UROLOGY | Facility: CLINIC | Age: 61
End: 2021-06-22

## 2021-06-22 DIAGNOSIS — Z20.2 STD EXPOSURE: ICD-10-CM

## 2021-06-22 DIAGNOSIS — C61 PROSTATE CANCER: Primary | ICD-10-CM

## 2021-06-23 ENCOUNTER — TELEPHONE (OUTPATIENT)
Dept: FAMILY MEDICINE | Facility: CLINIC | Age: 61
End: 2021-06-23

## 2021-06-23 ENCOUNTER — LAB VISIT (OUTPATIENT)
Dept: LAB | Facility: HOSPITAL | Age: 61
End: 2021-06-23
Attending: UROLOGY
Payer: COMMERCIAL

## 2021-06-23 DIAGNOSIS — C61 PROSTATE CANCER: ICD-10-CM

## 2021-06-23 DIAGNOSIS — E78.5 HYPERLIPIDEMIA, UNSPECIFIED HYPERLIPIDEMIA TYPE: ICD-10-CM

## 2021-06-23 DIAGNOSIS — Z20.2 STD EXPOSURE: ICD-10-CM

## 2021-06-23 PROCEDURE — 36415 COLL VENOUS BLD VENIPUNCTURE: CPT | Performed by: UROLOGY

## 2021-06-23 PROCEDURE — 86703 HIV-1/HIV-2 1 RESULT ANTBDY: CPT | Performed by: UROLOGY

## 2021-06-23 PROCEDURE — 86592 SYPHILIS TEST NON-TREP QUAL: CPT | Performed by: UROLOGY

## 2021-06-23 RX ORDER — ATORVASTATIN CALCIUM 80 MG/1
TABLET, FILM COATED ORAL
Qty: 90 TABLET | Refills: 1 | Status: SHIPPED | OUTPATIENT
Start: 2021-06-23 | End: 2021-10-22 | Stop reason: SDUPTHER

## 2021-06-24 ENCOUNTER — TELEPHONE (OUTPATIENT)
Dept: UROLOGY | Facility: CLINIC | Age: 61
End: 2021-06-24

## 2021-06-24 LAB
HIV 1+2 AB+HIV1 P24 AG SERPL QL IA: NEGATIVE
RPR SER QL: NORMAL

## 2021-06-28 ENCOUNTER — TELEPHONE (OUTPATIENT)
Dept: FAMILY MEDICINE | Facility: CLINIC | Age: 61
End: 2021-06-28

## 2021-07-02 ENCOUNTER — CLINICAL SUPPORT (OUTPATIENT)
Dept: INTERNAL MEDICINE | Facility: CLINIC | Age: 61
End: 2021-07-02
Payer: COMMERCIAL

## 2021-07-02 DIAGNOSIS — Z23 ENCOUNTER FOR IMMUNIZATION: ICD-10-CM

## 2021-07-02 PROCEDURE — 90471 ZOSTER RECOMBINANT VACCINE: ICD-10-PCS | Mod: S$GLB,,, | Performed by: FAMILY MEDICINE

## 2021-07-02 PROCEDURE — 90471 IMMUNIZATION ADMIN: CPT | Mod: S$GLB,,, | Performed by: FAMILY MEDICINE

## 2021-07-02 PROCEDURE — 99999 PR PBB SHADOW E&M-EST. PATIENT-LVL I: ICD-10-PCS | Mod: PBBFAC,,,

## 2021-07-02 PROCEDURE — 99999 PR PBB SHADOW E&M-EST. PATIENT-LVL I: CPT | Mod: PBBFAC,,,

## 2021-07-02 PROCEDURE — 90750 HZV VACC RECOMBINANT IM: CPT | Mod: S$GLB,,, | Performed by: FAMILY MEDICINE

## 2021-07-02 PROCEDURE — 90750 ZOSTER RECOMBINANT VACCINE: ICD-10-PCS | Mod: S$GLB,,, | Performed by: FAMILY MEDICINE

## 2021-09-13 ENCOUNTER — TELEPHONE (OUTPATIENT)
Dept: UROLOGY | Facility: CLINIC | Age: 61
End: 2021-09-13

## 2021-09-15 ENCOUNTER — LAB VISIT (OUTPATIENT)
Dept: LAB | Facility: HOSPITAL | Age: 61
End: 2021-09-15
Attending: UROLOGY
Payer: COMMERCIAL

## 2021-09-15 DIAGNOSIS — C61 PROSTATE CANCER: ICD-10-CM

## 2021-09-15 LAB — COMPLEXED PSA SERPL-MCNC: 0.02 NG/ML (ref 0–4)

## 2021-09-15 PROCEDURE — 84153 ASSAY OF PSA TOTAL: CPT | Performed by: UROLOGY

## 2021-09-15 PROCEDURE — 36415 COLL VENOUS BLD VENIPUNCTURE: CPT | Performed by: UROLOGY

## 2021-09-27 ENCOUNTER — IMMUNIZATION (OUTPATIENT)
Dept: PRIMARY CARE CLINIC | Facility: CLINIC | Age: 61
End: 2021-09-27
Payer: COMMERCIAL

## 2021-09-27 DIAGNOSIS — Z23 NEED FOR VACCINATION: Primary | ICD-10-CM

## 2021-09-27 PROCEDURE — 0003A COVID-19, MRNA, LNP-S, PF, 30 MCG/0.3 ML DOSE VACCINE: ICD-10-PCS | Mod: S$GLB,,, | Performed by: FAMILY MEDICINE

## 2021-09-27 PROCEDURE — 91300 COVID-19, MRNA, LNP-S, PF, 30 MCG/0.3 ML DOSE VACCINE: ICD-10-PCS | Mod: S$GLB,,, | Performed by: FAMILY MEDICINE

## 2021-09-27 PROCEDURE — 0003A COVID-19, MRNA, LNP-S, PF, 30 MCG/0.3 ML DOSE VACCINE: CPT | Mod: S$GLB,,, | Performed by: FAMILY MEDICINE

## 2021-09-27 PROCEDURE — 91300 COVID-19, MRNA, LNP-S, PF, 30 MCG/0.3 ML DOSE VACCINE: CPT | Mod: S$GLB,,, | Performed by: FAMILY MEDICINE

## 2021-09-29 DIAGNOSIS — F41.9 ANXIETY AND DEPRESSION: ICD-10-CM

## 2021-09-29 DIAGNOSIS — F32.A ANXIETY AND DEPRESSION: ICD-10-CM

## 2021-09-30 RX ORDER — ESCITALOPRAM OXALATE 10 MG/1
10 TABLET ORAL DAILY
Qty: 90 TABLET | Refills: 0 | Status: SHIPPED | OUTPATIENT
Start: 2021-09-30 | End: 2021-10-22 | Stop reason: SDUPTHER

## 2021-10-06 ENCOUNTER — TELEPHONE (OUTPATIENT)
Dept: FAMILY MEDICINE | Facility: CLINIC | Age: 61
End: 2021-10-06

## 2021-10-07 ENCOUNTER — PATIENT MESSAGE (OUTPATIENT)
Dept: FAMILY MEDICINE | Facility: CLINIC | Age: 61
End: 2021-10-07

## 2021-10-22 ENCOUNTER — OFFICE VISIT (OUTPATIENT)
Dept: FAMILY MEDICINE | Facility: CLINIC | Age: 61
End: 2021-10-22
Attending: FAMILY MEDICINE
Payer: COMMERCIAL

## 2021-10-22 VITALS
TEMPERATURE: 98 F | SYSTOLIC BLOOD PRESSURE: 136 MMHG | BODY MASS INDEX: 36.65 KG/M2 | OXYGEN SATURATION: 97 % | DIASTOLIC BLOOD PRESSURE: 88 MMHG | HEART RATE: 61 BPM | WEIGHT: 276.56 LBS | HEIGHT: 73 IN | RESPIRATION RATE: 18 BRPM

## 2021-10-22 DIAGNOSIS — F32.A ANXIETY AND DEPRESSION: ICD-10-CM

## 2021-10-22 DIAGNOSIS — E78.5 HYPERLIPIDEMIA, UNSPECIFIED HYPERLIPIDEMIA TYPE: ICD-10-CM

## 2021-10-22 DIAGNOSIS — Z00.00 ENCOUNTER FOR PREVENTIVE HEALTH EXAMINATION: Primary | ICD-10-CM

## 2021-10-22 DIAGNOSIS — Z86.010 HISTORY OF COLON POLYPS: ICD-10-CM

## 2021-10-22 DIAGNOSIS — F41.9 ANXIETY AND DEPRESSION: ICD-10-CM

## 2021-10-22 DIAGNOSIS — I10 PRIMARY HYPERTENSION: ICD-10-CM

## 2021-10-22 DIAGNOSIS — C61 PRIMARY ADENOCARCINOMA OF PROSTATE: ICD-10-CM

## 2021-10-22 DIAGNOSIS — E66.9 OBESITY (BMI 35.0-39.9 WITHOUT COMORBIDITY): ICD-10-CM

## 2021-10-22 DIAGNOSIS — K21.9 GASTROESOPHAGEAL REFLUX DISEASE WITHOUT ESOPHAGITIS: ICD-10-CM

## 2021-10-22 PROCEDURE — 3008F PR BODY MASS INDEX (BMI) DOCUMENTED: ICD-10-PCS | Mod: CPTII,S$GLB,, | Performed by: FAMILY MEDICINE

## 2021-10-22 PROCEDURE — 3008F BODY MASS INDEX DOCD: CPT | Mod: CPTII,S$GLB,, | Performed by: FAMILY MEDICINE

## 2021-10-22 PROCEDURE — 1159F MED LIST DOCD IN RCRD: CPT | Mod: CPTII,S$GLB,, | Performed by: FAMILY MEDICINE

## 2021-10-22 PROCEDURE — 99999 PR PBB SHADOW E&M-EST. PATIENT-LVL III: CPT | Mod: PBBFAC,,, | Performed by: FAMILY MEDICINE

## 2021-10-22 PROCEDURE — 3080F PR MOST RECENT DIASTOLIC BLOOD PRESSURE >= 90 MM HG: ICD-10-PCS | Mod: CPTII,S$GLB,, | Performed by: FAMILY MEDICINE

## 2021-10-22 PROCEDURE — 1160F PR REVIEW ALL MEDS BY PRESCRIBER/CLIN PHARMACIST DOCUMENTED: ICD-10-PCS | Mod: CPTII,S$GLB,, | Performed by: FAMILY MEDICINE

## 2021-10-22 PROCEDURE — 99396 PR PREVENTIVE VISIT,EST,40-64: ICD-10-PCS | Mod: S$GLB,,, | Performed by: FAMILY MEDICINE

## 2021-10-22 PROCEDURE — 99396 PREV VISIT EST AGE 40-64: CPT | Mod: S$GLB,,, | Performed by: FAMILY MEDICINE

## 2021-10-22 PROCEDURE — 1159F PR MEDICATION LIST DOCUMENTED IN MEDICAL RECORD: ICD-10-PCS | Mod: CPTII,S$GLB,, | Performed by: FAMILY MEDICINE

## 2021-10-22 PROCEDURE — 4010F ACE/ARB THERAPY RXD/TAKEN: CPT | Mod: CPTII,S$GLB,, | Performed by: FAMILY MEDICINE

## 2021-10-22 PROCEDURE — 3080F DIAST BP >= 90 MM HG: CPT | Mod: CPTII,S$GLB,, | Performed by: FAMILY MEDICINE

## 2021-10-22 PROCEDURE — 99999 PR PBB SHADOW E&M-EST. PATIENT-LVL III: ICD-10-PCS | Mod: PBBFAC,,, | Performed by: FAMILY MEDICINE

## 2021-10-22 PROCEDURE — 3077F SYST BP >= 140 MM HG: CPT | Mod: CPTII,S$GLB,, | Performed by: FAMILY MEDICINE

## 2021-10-22 PROCEDURE — 4010F PR ACE/ARB THEARPY RXD/TAKEN: ICD-10-PCS | Mod: CPTII,S$GLB,, | Performed by: FAMILY MEDICINE

## 2021-10-22 PROCEDURE — 3077F PR MOST RECENT SYSTOLIC BLOOD PRESSURE >= 140 MM HG: ICD-10-PCS | Mod: CPTII,S$GLB,, | Performed by: FAMILY MEDICINE

## 2021-10-22 PROCEDURE — 1160F RVW MEDS BY RX/DR IN RCRD: CPT | Mod: CPTII,S$GLB,, | Performed by: FAMILY MEDICINE

## 2021-10-22 RX ORDER — ATORVASTATIN CALCIUM 80 MG/1
80 TABLET, FILM COATED ORAL DAILY
Qty: 90 TABLET | Refills: 4 | Status: SHIPPED | OUTPATIENT
Start: 2021-10-22 | End: 2022-10-31 | Stop reason: SDUPTHER

## 2021-10-22 RX ORDER — ESCITALOPRAM OXALATE 10 MG/1
10 TABLET ORAL DAILY
Qty: 90 TABLET | Refills: 3 | Status: SHIPPED | OUTPATIENT
Start: 2021-10-22 | End: 2022-10-31 | Stop reason: SDUPTHER

## 2021-10-22 RX ORDER — CHLORHEXIDINE GLUCONATE ORAL RINSE 1.2 MG/ML
SOLUTION DENTAL
Qty: 118 ML | Refills: 11 | Status: SHIPPED | OUTPATIENT
Start: 2021-10-22 | End: 2022-11-10

## 2021-11-13 ENCOUNTER — HOSPITAL ENCOUNTER (EMERGENCY)
Facility: HOSPITAL | Age: 61
Discharge: HOME | End: 2021-11-13
Attending: EMERGENCY MEDICINE
Payer: COMMERCIAL

## 2021-11-13 VITALS
HEART RATE: 65 BPM | BODY MASS INDEX: 37.11 KG/M2 | HEIGHT: 73 IN | TEMPERATURE: 98 F | RESPIRATION RATE: 18 BRPM | WEIGHT: 280 LBS | SYSTOLIC BLOOD PRESSURE: 135 MMHG | OXYGEN SATURATION: 100 % | DIASTOLIC BLOOD PRESSURE: 88 MMHG

## 2021-11-13 DIAGNOSIS — H65.92 LEFT OTITIS MEDIA WITH EFFUSION: Primary | ICD-10-CM

## 2021-11-13 PROCEDURE — 99282 EMERGENCY DEPT VISIT SF MDM: CPT

## 2022-01-12 ENCOUNTER — HOSPITAL ENCOUNTER (OUTPATIENT)
Dept: RADIOLOGY | Facility: HOSPITAL | Age: 62
Discharge: HOME | End: 2022-01-12
Attending: OTOLARYNGOLOGY
Payer: COMMERCIAL

## 2022-01-12 DIAGNOSIS — J32.9 SINUSITIS, CHRONIC: ICD-10-CM

## 2022-01-12 PROCEDURE — 70486 CT MAXILLOFACIAL W/O DYE: CPT | Mod: 26,,, | Performed by: RADIOLOGY

## 2022-01-12 PROCEDURE — 70486 CT MAXILLOFACIAL W/O DYE: CPT | Mod: TC

## 2022-01-12 PROCEDURE — 70486 CT SINUSES WITHOUT CONTRAST: ICD-10-PCS | Mod: 26,,, | Performed by: RADIOLOGY

## 2022-02-25 NOTE — TELEPHONE ENCOUNTER
Care Due:                  Date            Visit Type   Department     Provider  --------------------------------------------------------------------------------                                EP -                              PRIMARY      [facility]  Last Visit: 10-      CARE ([facility])   PRACTICE       Abad Rasmussen  Next Visit: None Scheduled  None         None Found                                                            Last  Test          Frequency    Reason                     Performed    Due Date  --------------------------------------------------------------------------------    CMP.........  12 months..  atorvastatin, losartan...  04-   04-    Lipid Panel.  12 months..  atorvastatin.............  04-   04-    Powered by [employer]. Reference number: [id number].   2/25/2022 8:52:47 AM CST

## 2022-03-01 RX ORDER — OMEPRAZOLE 20 MG/1
CAPSULE, DELAYED RELEASE ORAL
Qty: 90 CAPSULE | Refills: 3 | Status: SHIPPED | OUTPATIENT
Start: 2022-03-01

## 2022-05-23 DIAGNOSIS — I10 ESSENTIAL HYPERTENSION: ICD-10-CM

## 2022-05-23 DIAGNOSIS — I10 PRIMARY HYPERTENSION: Primary | ICD-10-CM

## 2022-05-23 NOTE — TELEPHONE ENCOUNTER
Care Due:                  Date            Visit Type   Department     Provider  --------------------------------------------------------------------------------                                EP -                              PRIMARY      [facility]  Last Visit: 10-      CARE ([facility])   PRACTICE       Abad Rasmussen  Next Visit: None Scheduled  None         None Found                                                            Last  Test          Frequency    Reason                     Performed    Due Date  --------------------------------------------------------------------------------    CMP.........  12 months..  atorvastatin, losartan...  04-   04-    Lipid Panel.  12 months..  atorvastatin.............  04-   04-    [facility] Embedded Care Gaps. Reference number: [id number]. 5/23/2022   4:33:59 PM CDT

## 2022-05-24 RX ORDER — HYDROCHLOROTHIAZIDE 25 MG/1
TABLET ORAL
Qty: 90 TABLET | Refills: 0 | Status: SHIPPED | OUTPATIENT
Start: 2022-05-24 | End: 2022-08-28

## 2022-05-24 NOTE — TELEPHONE ENCOUNTER
Refill Routing Note   Medication(s) are not appropriate for processing by [facility] for the following reason(s):      - Required laboratory values are outdated  - Patient has been seen in the ED/Hospital since the last PCP visit    [facility] action(s):  Defer          Medication reconciliation completed: No     Appointments  past 12m or future 3m with PCP    Date Provider   Last Visit   10/22/2021 Abad Rasmussen MD   Next Visit   Visit date not found Abad Rasmussen MD   ED visits in past 90 days: 0        Note composed:7:13 AM 05/24/2022

## 2022-05-24 NOTE — TELEPHONE ENCOUNTER
He is due for a CMP and lipid panel.  Orders are in.  He also needs a blood pressure check, nurses check would be okay

## 2022-05-26 DIAGNOSIS — I10 ESSENTIAL HYPERTENSION: ICD-10-CM

## 2022-05-26 RX ORDER — HYDROCHLOROTHIAZIDE 25 MG/1
TABLET ORAL
Qty: 90 TABLET | Refills: 0 | OUTPATIENT
Start: 2022-05-26

## 2022-05-26 NOTE — TELEPHONE ENCOUNTER
No new care gaps identified.  [facility] Embedded Care Gaps. Reference number: [id number]. 5/26/2022   9:03:56 AM NADERT

## 2022-05-31 NOTE — TELEPHONE ENCOUNTER
I called and spoke w/ Mr. Luna in regards to his labs and his nurse BP check and he states that he needs two weeks to recover and he will contact to set up the appointments.

## 2022-07-07 ENCOUNTER — TELEPHONE (OUTPATIENT)
Dept: FAMILY MEDICINE | Facility: CLINIC | Age: 62
End: 2022-07-07
Payer: COMMERCIAL

## 2022-07-07 NOTE — TELEPHONE ENCOUNTER
See Dr. Aguayo office visit of September 3, 2020. She is following him for prostate cancer and wants to be responsible for ordering his PSAs.  She has recommended to do them yearly for life and he is two months early.

## 2022-07-07 NOTE — TELEPHONE ENCOUNTER
----- Message from Sofia Sosa sent at 7/7/2022  9:29 AM CDT -----  Contact: pt  Type:  Patient Returning Call    Who Called:  Patient   Who Left Message for Patient:  nurse Madison     Does the patient know what this is regarding?:      Best Call Back Number:  [phone] (home) [phone] (work)    Additional Information:  Patient is returning your call he missed a call from the office

## 2022-07-07 NOTE — TELEPHONE ENCOUNTER
----- Message from Kaya Watson sent at 7/7/2022  8:51 AM CDT -----  Contact: Patient  Type: Apoointment Request    Name of Caller: Patient     When is the first available appointment?       Would the patient rather a call back or a response via [facility]?   Call    Best Call Back Number: [phone] (home) [phone] (work)    Additional Information:  Patient needs to come in on 07/11 for a BP check

## 2022-07-07 NOTE — TELEPHONE ENCOUNTER
Please refer to telephone encounter dated 5-31-22. Per Dr. Rasmussen patient due for fasting labs and nurse blood pressure check. Patient was unable to schedule at that time due to having had a procedure performed, and needed time to recover. Patient returned call today to schedule both appointments. Fasting lab appointment for CMP and Lipid Panel as well as nurse blood pressure check scheduled for Monday 7-11-22. Patient would like to add PSA order. Last PSA performed 9-15-21. Please advise if PSA order can be added. Thank you.

## 2022-07-11 ENCOUNTER — LAB VISIT (OUTPATIENT)
Dept: LAB | Facility: HOSPITAL | Age: 62
End: 2022-07-11
Attending: FAMILY MEDICINE
Payer: COMMERCIAL

## 2022-07-11 ENCOUNTER — CLINICAL SUPPORT (OUTPATIENT)
Dept: FAMILY MEDICINE | Facility: CLINIC | Age: 62
End: 2022-07-11
Payer: COMMERCIAL

## 2022-07-11 VITALS — DIASTOLIC BLOOD PRESSURE: 86 MMHG | SYSTOLIC BLOOD PRESSURE: 138 MMHG

## 2022-07-11 DIAGNOSIS — I10 PRIMARY HYPERTENSION: ICD-10-CM

## 2022-07-11 DIAGNOSIS — I10 ESSENTIAL HYPERTENSION: ICD-10-CM

## 2022-07-11 DIAGNOSIS — Z01.30 BP CHECK: Primary | ICD-10-CM

## 2022-07-11 LAB
ALBUMIN SERPL BCP-MCNC: 3.9 G/DL (ref 3.5–5.2)
ALP SERPL-CCNC: 83 U/L (ref 55–135)
ALT SERPL W/O P-5'-P-CCNC: 20 U/L (ref 10–44)
ANION GAP SERPL CALC-SCNC: 10 MMOL/L (ref 8–16)
AST SERPL-CCNC: 16 U/L (ref 10–40)
BILIRUB SERPL-MCNC: 0.6 MG/DL (ref 0.1–1)
BUN SERPL-MCNC: 12 MG/DL (ref 8–23)
CALCIUM SERPL-MCNC: 9.4 MG/DL (ref 8.7–10.5)
CHLORIDE SERPL-SCNC: 106 MMOL/L (ref 95–110)
CHOLEST SERPL-MCNC: 191 MG/DL (ref 120–199)
CHOLEST/HDLC SERPL: 3.4 {RATIO} (ref 2–5)
CO2 SERPL-SCNC: 26 MMOL/L (ref 23–29)
CREAT SERPL-MCNC: 1 MG/DL (ref 0.5–1.4)
EST. GFR  (AFRICAN AMERICAN): >60 ML/MIN/1.73 M^2
EST. GFR  (NON AFRICAN AMERICAN): >60 ML/MIN/1.73 M^2
GLUCOSE SERPL-MCNC: 100 MG/DL (ref 70–110)
HDLC SERPL-MCNC: 57 MG/DL (ref 40–75)
HDLC SERPL: 29.8 % (ref 20–50)
LDLC SERPL CALC-MCNC: 120.8 MG/DL (ref 63–159)
NONHDLC SERPL-MCNC: 134 MG/DL
POTASSIUM SERPL-SCNC: 3.8 MMOL/L (ref 3.5–5.1)
PROT SERPL-MCNC: 7.4 G/DL (ref 6–8.4)
SODIUM SERPL-SCNC: 142 MMOL/L (ref 136–145)
TRIGL SERPL-MCNC: 66 MG/DL (ref 30–150)

## 2022-07-11 PROCEDURE — 36415 COLL VENOUS BLD VENIPUNCTURE: CPT | Performed by: FAMILY MEDICINE

## 2022-07-11 PROCEDURE — 99999 PR PBB SHADOW E&M-EST. PATIENT-LVL II: ICD-10-PCS | Mod: PBBFAC,,,

## 2022-07-11 PROCEDURE — 80053 COMPREHEN METABOLIC PANEL: CPT | Performed by: FAMILY MEDICINE

## 2022-07-11 PROCEDURE — 99999 PR PBB SHADOW E&M-EST. PATIENT-LVL II: CPT | Mod: PBBFAC,,,

## 2022-07-11 PROCEDURE — 80061 LIPID PANEL: CPT | Performed by: FAMILY MEDICINE

## 2022-07-11 NOTE — PROGRESS NOTES
Rodo Luna 62 y.o. male is here today for Blood Pressure check.   History of HTN yes.    Review of patient's allergies indicates:   Allergen Reactions    Doxycycline      Other reaction(s): Rash  Other reaction(s): Rash    Phenytoin sodium extended      Other reaction(s): Bright light    Sulfa (sulfonamide antibiotics) Rash     Creatinine   Date Value Ref Range Status   04/10/2021 1.2 0.5 - 1.4 mg/dL Final   10/18/2012 1.0 0.5 - 1.4 mg/dL Final     Sodium   Date Value Ref Range Status   04/10/2021 140 136 - 145 mmol/L Final     Potassium   Date Value Ref Range Status   04/10/2021 3.8 3.5 - 5.1 mmol/L Final   ]  Patient verifies taking blood pressure medications on a regular basis at the same time of the day.     Current Outpatient Medications:     atorvastatin (LIPITOR) 80 MG tablet, Take 1 tablet (80 mg total) by mouth once daily., Disp: 90 tablet, Rfl: 4    azelastine (ASTELIN) 137 mcg (0.1 %) nasal spray, SPRAY 1 SPRAY IN EACH NOSTRIL 2 TIMES A DAY, Disp: 30 mL, Rfl: 0    EScitalopram oxalate (LEXAPRO) 10 MG tablet, Take 1 tablet (10 mg total) by mouth once daily., Disp: 90 tablet, Rfl: 3    fluticasone propionate (FLONASE) 50 mcg/actuation nasal spray, SPRAY 1 SPRAY IN EACH NOSTRIL 2 TIMES A DAY, Disp: 48 mL, Rfl: 4    hydroCHLOROthiazide (HYDRODIURIL) 25 MG tablet, TAKE 1 TABLET BY MOUTH EVERY DAY, Disp: 90 tablet, Rfl: 0    losartan (COZAAR) 100 MG tablet, TAKE 1 TABLET BY MOUTH EVERY DAY, Disp: 90 tablet, Rfl: 3    omeprazole (PRILOSEC) 20 MG capsule, TAKE 1 CAPSULE BY MOUTH EVERY DAY, Disp: 90 capsule, Rfl: 3    chlorhexidine (PERIDEX) 0.12 % solution, RINSE WITH 30 MLS AND SPIT OUT AT BEDTIME, Disp: 118 mL, Rfl: 11    ofloxacin (OCUFLOX) 0.3 % ophthalmic solution, , Disp: , Rfl:     prednisoLONE acetate (PRED FORTE) 1 % DrpS, , Disp: , Rfl:     Patient is asymptomatic.         ,   .  Blood pressure reading 138/86

## 2022-08-18 DIAGNOSIS — I10 ESSENTIAL HYPERTENSION: ICD-10-CM

## 2022-08-18 RX ORDER — LOSARTAN POTASSIUM 100 MG/1
TABLET ORAL
Qty: 90 TABLET | Refills: 0 | Status: SHIPPED | OUTPATIENT
Start: 2022-08-18 | End: 2022-11-04 | Stop reason: SDUPTHER

## 2022-08-18 NOTE — TELEPHONE ENCOUNTER
Care Due:                  Date            Visit Type   Department     Provider  --------------------------------------------------------------------------------                                EP -                              PRIMARY      [facility]  Last Visit: 10-      CARE ([facility])   PRACTICE       Abad Rasmussen  Next Visit: None Scheduled  None         None Found                                                            Last  Test          Frequency    Reason                     Performed    Due Date  --------------------------------------------------------------------------------    Office Visit  12 months..  EScitalopram,              10-   10-                             atorvastatin,                             hydroCHLOROthiazide,                             losartan, omeprazole.....    [employer] Embedded Care Gaps. Reference number: [id number]. 8/18/2022   1:25:05 AM CDT

## 2022-08-23 ENCOUNTER — TELEPHONE (OUTPATIENT)
Dept: UROLOGY | Facility: CLINIC | Age: 62
End: 2022-08-23
Payer: COMMERCIAL

## 2022-08-23 DIAGNOSIS — C61 PROSTATE CANCER: Primary | ICD-10-CM

## 2022-08-23 NOTE — TELEPHONE ENCOUNTER
----- Message from Juanita Sousa sent at 8/23/2022 12:45 PM CDT -----  Contact: Patient  Type: Patient Call Back         Who called: Patient         What is the request in detail: calling to sched 1 year f/u ; states [place] and will be avail until 8/12 for an appt; please advise         Best call back number: [phone]         Additional Information: the earlier the appt the better           Thank You    
Spoke with patient 1 year f/u scheduled. Patient will have blood work drawn on 8/30  
36.8

## 2022-08-29 ENCOUNTER — PATIENT MESSAGE (OUTPATIENT)
Dept: UROLOGY | Facility: CLINIC | Age: 62
End: 2022-08-29
Payer: COMMERCIAL

## 2022-08-30 ENCOUNTER — LAB VISIT (OUTPATIENT)
Dept: LAB | Facility: HOSPITAL | Age: 62
End: 2022-08-30
Attending: UROLOGY
Payer: COMMERCIAL

## 2022-08-30 DIAGNOSIS — C61 PROSTATE CANCER: ICD-10-CM

## 2022-08-30 LAB — COMPLEXED PSA SERPL-MCNC: 0.02 NG/ML (ref 0–4)

## 2022-08-30 PROCEDURE — 36415 COLL VENOUS BLD VENIPUNCTURE: CPT | Performed by: UROLOGY

## 2022-08-30 PROCEDURE — 84153 ASSAY OF PSA TOTAL: CPT | Performed by: UROLOGY

## 2022-09-01 ENCOUNTER — OFFICE VISIT (OUTPATIENT)
Dept: UROLOGY | Facility: CLINIC | Age: 62
End: 2022-09-01
Payer: COMMERCIAL

## 2022-09-01 VITALS
WEIGHT: 275 LBS | SYSTOLIC BLOOD PRESSURE: 165 MMHG | RESPIRATION RATE: 18 BRPM | HEIGHT: 73 IN | DIASTOLIC BLOOD PRESSURE: 95 MMHG | BODY MASS INDEX: 36.45 KG/M2 | HEART RATE: 60 BPM

## 2022-09-01 DIAGNOSIS — C61 PROSTATE CANCER: Primary | ICD-10-CM

## 2022-09-01 LAB
BILIRUB SERPL-MCNC: ABNORMAL MG/DL
BLOOD URINE, POC: ABNORMAL
CLARITY, POC UA: CLEAR
COLOR, POC UA: YELLOW
GLUCOSE UR QL STRIP: ABNORMAL
KETONES UR QL STRIP: ABNORMAL
LEUKOCYTE ESTERASE URINE, POC: ABNORMAL
NITRITE, POC UA: ABNORMAL
PH, POC UA: 6
PROTEIN, POC: ABNORMAL
SPECIFIC GRAVITY, POC UA: 1.03
UROBILINOGEN, POC UA: 0.2

## 2022-09-01 PROCEDURE — 4010F PR ACE/ARB THEARPY RXD/TAKEN: ICD-10-PCS | Mod: CPTII,S$GLB,, | Performed by: UROLOGY

## 2022-09-01 PROCEDURE — 3080F DIAST BP >= 90 MM HG: CPT | Mod: CPTII,S$GLB,, | Performed by: UROLOGY

## 2022-09-01 PROCEDURE — 81002 POCT URINE DIPSTICK WITHOUT MICROSCOPE: ICD-10-PCS | Mod: S$GLB,,, | Performed by: UROLOGY

## 2022-09-01 PROCEDURE — 3077F PR MOST RECENT SYSTOLIC BLOOD PRESSURE >= 140 MM HG: ICD-10-PCS | Mod: CPTII,S$GLB,, | Performed by: UROLOGY

## 2022-09-01 PROCEDURE — 99213 OFFICE O/P EST LOW 20 MIN: CPT | Mod: S$GLB,,, | Performed by: UROLOGY

## 2022-09-01 PROCEDURE — 1159F PR MEDICATION LIST DOCUMENTED IN MEDICAL RECORD: ICD-10-PCS | Mod: CPTII,S$GLB,, | Performed by: UROLOGY

## 2022-09-01 PROCEDURE — 3077F SYST BP >= 140 MM HG: CPT | Mod: CPTII,S$GLB,, | Performed by: UROLOGY

## 2022-09-01 PROCEDURE — 99213 PR OFFICE/OUTPT VISIT, EST, LEVL III, 20-29 MIN: ICD-10-PCS | Mod: S$GLB,,, | Performed by: UROLOGY

## 2022-09-01 PROCEDURE — 3008F BODY MASS INDEX DOCD: CPT | Mod: CPTII,S$GLB,, | Performed by: UROLOGY

## 2022-09-01 PROCEDURE — 4010F ACE/ARB THERAPY RXD/TAKEN: CPT | Mod: CPTII,S$GLB,, | Performed by: UROLOGY

## 2022-09-01 PROCEDURE — 3008F PR BODY MASS INDEX (BMI) DOCUMENTED: ICD-10-PCS | Mod: CPTII,S$GLB,, | Performed by: UROLOGY

## 2022-09-01 PROCEDURE — 99999 PR PBB SHADOW E&M-EST. PATIENT-LVL III: ICD-10-PCS | Mod: PBBFAC,,, | Performed by: UROLOGY

## 2022-09-01 PROCEDURE — 1160F PR REVIEW ALL MEDS BY PRESCRIBER/CLIN PHARMACIST DOCUMENTED: ICD-10-PCS | Mod: CPTII,S$GLB,, | Performed by: UROLOGY

## 2022-09-01 PROCEDURE — 1159F MED LIST DOCD IN RCRD: CPT | Mod: CPTII,S$GLB,, | Performed by: UROLOGY

## 2022-09-01 PROCEDURE — 1160F RVW MEDS BY RX/DR IN RCRD: CPT | Mod: CPTII,S$GLB,, | Performed by: UROLOGY

## 2022-09-01 PROCEDURE — 81002 URINALYSIS NONAUTO W/O SCOPE: CPT | Mod: S$GLB,,, | Performed by: UROLOGY

## 2022-09-01 PROCEDURE — 3080F PR MOST RECENT DIASTOLIC BLOOD PRESSURE >= 90 MM HG: ICD-10-PCS | Mod: CPTII,S$GLB,, | Performed by: UROLOGY

## 2022-09-01 PROCEDURE — 99999 PR PBB SHADOW E&M-EST. PATIENT-LVL III: CPT | Mod: PBBFAC,,, | Performed by: UROLOGY

## 2022-09-01 RX ORDER — ONDANSETRON 4 MG/1
4 TABLET, ORALLY DISINTEGRATING ORAL EVERY 8 HOURS PRN
COMMUNITY
Start: 2022-05-27 | End: 2022-11-18

## 2022-09-01 RX ORDER — CEFUROXIME AXETIL 250 MG/1
250 TABLET ORAL 2 TIMES DAILY
COMMUNITY
Start: 2022-05-24 | End: 2022-09-01

## 2022-09-01 RX ORDER — PREDNISONE 10 MG/1
10 TABLET ORAL 2 TIMES DAILY
COMMUNITY
Start: 2022-05-24 | End: 2022-11-18

## 2022-09-01 RX ORDER — HYDROCODONE BITARTRATE AND ACETAMINOPHEN 10; 325 MG/1; MG/1
1 TABLET ORAL EVERY 6 HOURS PRN
COMMUNITY
Start: 2022-06-02 | End: 2022-09-01

## 2022-09-01 RX ORDER — CEPHALEXIN 500 MG/1
CAPSULE ORAL 3 TIMES DAILY
COMMUNITY
Start: 2022-05-27 | End: 2022-09-01

## 2022-09-01 NOTE — PROGRESS NOTES
[facility] Note - [place]  Staff: MD Kalyan  PCP: Abad Rasmussen MD  Mychart: pending  Chief Complaint: see below    Subjective:        HPI: Rodo Luna is a 62 y.o. male     Interval history by  6/5/15:   Mr. Luna is a 55-year-old male who was evaluated for hematuria.  The   ultrasound of the kidney was within normal limits.  The cystoscopy did show   evidence of severe prostatitis.  The patient has been informed of those findings   and has been suggested that he should be put in suppressive therapy, taking   Bactrim DS p.o. daily one pill everyday for the next four months.  I explained   to the patient that the remaining of the workup was essentially negative. rbus 5/13/15 negative.ctap w 12/8/14: no kidney stones or lesions  No evidence of tumors in the bladder or upper tract, and also no evidence of significant urolithiasis.  The patient understood.  He is going to be taking Bactrim DS p.o. daily for four months and will have a followup appointment in four months.  All the questions were answered at his satisfaction.  He left the office in satisfactory condition.      Interval history by  4/27/18:   Mr. Luna for is a 58-year-old male last seen inour office in 11/2016.  This patient in 2012 was diagnosed and treated for prostate cancer  (Gl 3+4, Gl 3+3) with eligard x 6 months & brachytherapy.   The patient is here for his followup visit.  He is doing well.  He is not having any significant issues.  The patient denies any urinary symptoms.  He has no nocturia, dysuria or hematuria.  The flow is adequate and he feels like he can empty the bladder satisfactorily.  The patient denies any blood in the stool or difficulty with bowel movements.    Also, denies any bone pain.    Interval history 9/3/20:   He says he had a psa done at [facility] and was told it was 0.00 but I don't see it. Either it was 0.03 in 1/8/20.   Having sinus issues  Good urine  stream. No hematuria. No prostatitis.      Interval history 9/1/22:  He's doing well. No problems urinating. Great erections on no meds. Psa the same 0.02 8/30 (same as last year). Ua with tr bld. Denies any GH. Works at [employer].     Urine history:no tobacco. No anticoag. No personal or family hx of stones. No family hx of kidney or bladder cancer.   9/1/22  Tr bld  4/10/21 Neg  9/3/20  Neg  6/5/15  Tr bld    PSA history: no family hx   8/30/22 0.02  9/15/21 0.02  12/30/20 0.03  1/8/20  0.03  1/25/19 0.05  10/20/17 0.06  10/17/16 0.12  10/23/15 0.17  11/7/14 0.41  7/2/14  0.39  5/28/14 0.70   12/2/13 0.37  6/4/13  <0.01  1/9/13  0.07  2012  Brachytherapy  7/17/12 Gl 3+4 (RB 40%), Gl 3+3 (RM 60%)  6/30/12 4.5  4/28/12 3.61  4/2/12  4.88  3/5/11  2.4      Review of Systems  General ROS: no fevers, no chills  Psychological ROS: no depression  Endocrine ROS: no diabetes  Respiratory ROS: no SOB  Cardiovascular ROS: no CP  Gastrointestinal ROS: no abdominal pain, no constipation, no diarrhea  Musculoskeletal R/OS: no muscle pain  Neurological ROS: no headaches  Dermatological ROS: no rashes  HEENT: no glasses, no sinus   ROS: per HPI               Stents/Valves/Foreign Bodies/Cardiac Evaluation/Cardiologist: 4 stress tests, last one 2020    Family history:    or gyn malignancies: cousins with prostate cancer.   kidney stones: no      Soc Hx:  Social History     Tobacco Use    Smoking status: Never    Smokeless tobacco: Never   Substance Use Topics    Alcohol use: Not Currently     Alcohol/week: 0.0 standard drinks     Comment: SOCIAL    Drug use: No     No alcohol  Lives in : [place]  : +  Children: 6  Patient's occupation:  for [employer]      Allergies:  Doxycycline, Phenytoin sodium extended, and Sulfa (sulfonamide antibiotics)  Sulfa and doxy - rash     Anticoagulation/Aspirin: none    Objective:     Vitals:    09/01/22 1326   BP: (!) 165/95   Pulse: 60   Resp: 18          exam   Deferred      LABS REVIEW:  Recent Labs   Lab 01/08/20  1239 04/10/21  0734   WBC 8.09 7.23   Hemoglobin 16.2 15.2   Hematocrit 48.7 45.3   Platelets 199 214   ]  Recent Labs   Lab 01/08/20  1239 05/09/20  0820 04/10/21  0734 07/11/22  0812   Sodium 140  --  140 142   Potassium 3.5  --  3.8 3.8   Chloride 103  --  104 106   CO2 23  --  28 26   BUN 11  --  15 12   Creatinine 1.0  --  1.2 1.0   Glucose 83  --  113 H 100   Calcium 10.1  --  9.2 9.4   Alkaline Phosphatase 68 73 61 83   Total Protein 8.2 8.0 7.3 7.4   Albumin 4.5 4.5 4.0 3.9   Total Bilirubin 0.8 0.5 0.4 0.6   AST 19 25 15 16   ALT 21 38 21 20   ]    No results found for: LABA1C, HGBA1C        Assessment:       1. Prostate cancer        [id number]+4N0M0 s/p brachytherapy 2012    Plan:     F/u in 1 year with diagnostic psa prior   Needs psa yearly for rest of life.         Roxanna Biggs MD

## 2022-10-31 DIAGNOSIS — E78.5 HYPERLIPIDEMIA, UNSPECIFIED HYPERLIPIDEMIA TYPE: ICD-10-CM

## 2022-10-31 DIAGNOSIS — I10 ESSENTIAL HYPERTENSION: ICD-10-CM

## 2022-10-31 DIAGNOSIS — F41.9 ANXIETY AND DEPRESSION: ICD-10-CM

## 2022-10-31 DIAGNOSIS — F32.A ANXIETY AND DEPRESSION: ICD-10-CM

## 2022-10-31 NOTE — TELEPHONE ENCOUNTER
No new care gaps identified.  [facility] Embedded Care Gaps. Reference number: [id number]. 10/31/2022   3:34:01 PM CDT   neurologic

## 2022-11-01 RX ORDER — ESCITALOPRAM OXALATE 10 MG/1
10 TABLET ORAL DAILY
Qty: 90 TABLET | Refills: 0 | Status: SHIPPED | OUTPATIENT
Start: 2022-11-01 | End: 2023-02-06

## 2022-11-01 RX ORDER — HYDROCHLOROTHIAZIDE 25 MG/1
TABLET ORAL
Qty: 90 TABLET | Refills: 0 | Status: SHIPPED | OUTPATIENT
Start: 2022-11-01

## 2022-11-01 RX ORDER — ATORVASTATIN CALCIUM 80 MG/1
80 TABLET, FILM COATED ORAL DAILY
Qty: 90 TABLET | Refills: 0 | Status: SHIPPED | OUTPATIENT
Start: 2022-11-01 | End: 2023-02-06

## 2022-11-04 DIAGNOSIS — I10 ESSENTIAL HYPERTENSION: ICD-10-CM

## 2022-11-04 RX ORDER — LOSARTAN POTASSIUM 100 MG/1
100 TABLET ORAL DAILY
Qty: 90 TABLET | Refills: 0 | Status: SHIPPED | OUTPATIENT
Start: 2022-11-04 | End: 2023-02-06

## 2022-11-04 NOTE — TELEPHONE ENCOUNTER
No new care gaps identified.  [facility] Embedded Care Gaps. Reference number: [id number]. 11/04/2022   12:37:35 PM CDT

## 2022-11-18 ENCOUNTER — OFFICE VISIT (OUTPATIENT)
Dept: FAMILY MEDICINE | Facility: CLINIC | Age: 62
End: 2022-11-18
Attending: FAMILY MEDICINE
Payer: COMMERCIAL

## 2022-11-18 VITALS
TEMPERATURE: 98 F | SYSTOLIC BLOOD PRESSURE: 130 MMHG | WEIGHT: 270.19 LBS | HEIGHT: 73 IN | HEART RATE: 58 BPM | BODY MASS INDEX: 35.81 KG/M2 | RESPIRATION RATE: 17 BRPM | OXYGEN SATURATION: 98 % | DIASTOLIC BLOOD PRESSURE: 78 MMHG

## 2022-11-18 DIAGNOSIS — E78.2 MIXED HYPERLIPIDEMIA: ICD-10-CM

## 2022-11-18 DIAGNOSIS — Z86.010 HISTORY OF COLON POLYPS: ICD-10-CM

## 2022-11-18 DIAGNOSIS — C61 PRIMARY ADENOCARCINOMA OF PROSTATE: ICD-10-CM

## 2022-11-18 DIAGNOSIS — E66.9 OBESITY (BMI 35.0-39.9 WITHOUT COMORBIDITY): ICD-10-CM

## 2022-11-18 DIAGNOSIS — I51.7 LEFT VENTRICULAR HYPERTROPHY: ICD-10-CM

## 2022-11-18 DIAGNOSIS — Z00.00 ENCOUNTER FOR PREVENTIVE HEALTH EXAMINATION: Primary | ICD-10-CM

## 2022-11-18 DIAGNOSIS — Z12.11 COLON CANCER SCREENING: ICD-10-CM

## 2022-11-18 DIAGNOSIS — J06.9 UPPER RESPIRATORY TRACT INFECTION, UNSPECIFIED TYPE: ICD-10-CM

## 2022-11-18 DIAGNOSIS — R05.9 COUGH, UNSPECIFIED TYPE: ICD-10-CM

## 2022-11-18 DIAGNOSIS — I10 PRIMARY HYPERTENSION: ICD-10-CM

## 2022-11-18 DIAGNOSIS — K21.9 GASTROESOPHAGEAL REFLUX DISEASE WITHOUT ESOPHAGITIS: ICD-10-CM

## 2022-11-18 PROCEDURE — 3078F DIAST BP <80 MM HG: CPT | Mod: CPTII,S$GLB,, | Performed by: FAMILY MEDICINE

## 2022-11-18 PROCEDURE — 99999 PR PBB SHADOW E&M-EST. PATIENT-LVL IV: ICD-10-PCS | Mod: PBBFAC,,, | Performed by: FAMILY MEDICINE

## 2022-11-18 PROCEDURE — 3078F PR MOST RECENT DIASTOLIC BLOOD PRESSURE < 80 MM HG: ICD-10-PCS | Mod: CPTII,S$GLB,, | Performed by: FAMILY MEDICINE

## 2022-11-18 PROCEDURE — 99999 PR PBB SHADOW E&M-EST. PATIENT-LVL IV: CPT | Mod: PBBFAC,,, | Performed by: FAMILY MEDICINE

## 2022-11-18 PROCEDURE — 4010F PR ACE/ARB THEARPY RXD/TAKEN: ICD-10-PCS | Mod: CPTII,S$GLB,, | Performed by: FAMILY MEDICINE

## 2022-11-18 PROCEDURE — 1159F MED LIST DOCD IN RCRD: CPT | Mod: CPTII,S$GLB,, | Performed by: FAMILY MEDICINE

## 2022-11-18 PROCEDURE — 4010F ACE/ARB THERAPY RXD/TAKEN: CPT | Mod: CPTII,S$GLB,, | Performed by: FAMILY MEDICINE

## 2022-11-18 PROCEDURE — 3008F BODY MASS INDEX DOCD: CPT | Mod: CPTII,S$GLB,, | Performed by: FAMILY MEDICINE

## 2022-11-18 PROCEDURE — 99396 PREV VISIT EST AGE 40-64: CPT | Mod: S$GLB,,, | Performed by: FAMILY MEDICINE

## 2022-11-18 PROCEDURE — 1160F PR REVIEW ALL MEDS BY PRESCRIBER/CLIN PHARMACIST DOCUMENTED: ICD-10-PCS | Mod: CPTII,S$GLB,, | Performed by: FAMILY MEDICINE

## 2022-11-18 PROCEDURE — 3075F PR MOST RECENT SYSTOLIC BLOOD PRESS GE 130-139MM HG: ICD-10-PCS | Mod: CPTII,S$GLB,, | Performed by: FAMILY MEDICINE

## 2022-11-18 PROCEDURE — 1160F RVW MEDS BY RX/DR IN RCRD: CPT | Mod: CPTII,S$GLB,, | Performed by: FAMILY MEDICINE

## 2022-11-18 PROCEDURE — 1159F PR MEDICATION LIST DOCUMENTED IN MEDICAL RECORD: ICD-10-PCS | Mod: CPTII,S$GLB,, | Performed by: FAMILY MEDICINE

## 2022-11-18 PROCEDURE — 3075F SYST BP GE 130 - 139MM HG: CPT | Mod: CPTII,S$GLB,, | Performed by: FAMILY MEDICINE

## 2022-11-18 PROCEDURE — 3008F PR BODY MASS INDEX (BMI) DOCUMENTED: ICD-10-PCS | Mod: CPTII,S$GLB,, | Performed by: FAMILY MEDICINE

## 2022-11-18 PROCEDURE — 99396 PR PREVENTIVE VISIT,EST,40-64: ICD-10-PCS | Mod: S$GLB,,, | Performed by: FAMILY MEDICINE

## 2022-11-18 RX ORDER — BENZONATATE 200 MG/1
200 CAPSULE ORAL 3 TIMES DAILY PRN
Qty: 60 CAPSULE | Refills: 2 | Status: SHIPPED | OUTPATIENT
Start: 2022-11-18 | End: 2022-11-28

## 2022-11-18 NOTE — PROGRESS NOTES
Subjective:       Patient ID: Rodo Luna is a 62 y.o. male.    Chief Complaint: Annual Exam (Pt states that he is here for his annual exam )    62-year-old male comes in for annual exam.  He has had an upper respiratory in infection with a persistent cough like a tingling in his throat with very little sputum production and clear when it does come up.  He has no fever chills or night sweats.  He is asking for some benzonatate for his cough.  According to epic he needs his flu vaccine but he had it in September at [facility] and it was found on the links website.  He is overdue for his colonoscopy with a history of colon polyps but none found on his most recent colonoscopy May 9, 2017 with Dr. Lau.  He has a history of adenocarcinoma of the prostate treated with Eligard and brachytherapy in 2012 and followed by Dr. Biggs.  A recent PSA was 0.02 8/30/2022.    Past Medical History:  No date: Allergy  No date: GERD (gastroesophageal reflux disease)  No date: Hematuria  No date: History of brachytherapy  No date: Hyperlipidemia  No date: Hypertension  No date: Primary adenocarcinoma of prostate      Comment:  nik 6, s/p brachytherapy  06/03/2016: URI (upper respiratory infection)      Comment:  offsholucia GARZA  5/13/2013: Varicose veins of leg with swelling  No date: Wears glasses    Past Surgical History:  06/12/2012: COLONOSCOPY      Comment:  Dr. Lau 5 year recheck  05/09/2017: COLONOSCOPY; N/A      Comment:  Procedure: COLONOSCOPY;  Surgeon: Louis Lau MD;                 Location: [facility];  Service: Endoscopy;  Laterality:                N/A;  personal history of colon polyps  family history of               colon cancer  No date: CYSTOSCOPY  No date: LIPOMA RESECTION      Comment:  RIGHT THIGH  05/2022: NASAL SEPTUM SURGERY; Left  No date: TONSILLECTOMY    Review of patient's family history indicates:    Social History    Tobacco Use      Smoking status: Never      Smokeless tobacco: Never    Alcohol  use: Not Currently      Alcohol/week: 0.0 standard drinks      Comment: SOCIAL    Drug use: No    Current Outpatient Medications on File Prior to Visit:  atorvastatin (LIPITOR) 80 MG tablet, Take 1 tablet (80 mg total) by mouth once daily., Disp: 90 tablet, Rfl: 0  azelastine (ASTELIN) 137 mcg (0.1 %) nasal spray, SPRAY 1 SPRAY IN EACH NOSTRIL 2 TIMES A DAY, Disp: 30 mL, Rfl: 0  chlorhexidine (PERIDEX) 0.12 % solution, RINSE WITH 30 MLS AND SPIT OUT AT BEDTIME, Disp: 473 mL, Rfl: 11  EScitalopram oxalate (LEXAPRO) 10 MG tablet, Take 1 tablet (10 mg total) by mouth once daily., Disp: 90 tablet, Rfl: 0  fluticasone propionate (FLONASE) 50 mcg/actuation nasal spray, SPRAY 1 SPRAY IN EACH NOSTRIL 2 TIMES A DAY, Disp: 48 mL, Rfl: 4  hydroCHLOROthiazide (HYDRODIURIL) 25 MG tablet, TAKE 1 TABLET ONCE DAILY. TAKE WITH LOSARTAN 100 MG DAILY. Strength: 25 mg, Disp: 90 tablet, Rfl: 0  losartan (COZAAR) 100 MG tablet, Take 1 tablet (100 mg total) by mouth once daily., Disp: 90 tablet, Rfl: 0  omeprazole (PRILOSEC) 20 MG capsule, TAKE 1 CAPSULE BY MOUTH EVERY DAY, Disp: 90 capsule, Rfl: 3  [DISCONTINUED] ofloxacin (OCUFLOX) 0.3 % ophthalmic solution, , Disp: , Rfl:   [DISCONTINUED] ondansetron (ZOFRAN-ODT) 4 MG TbDL, Take 4 mg by mouth every 8 (eight) hours as needed., Disp: , Rfl:   [DISCONTINUED] prednisoLONE acetate (PRED FORTE) 1 % DrpS, , Disp: , Rfl:   [DISCONTINUED] predniSONE (DELTASONE) 10 MG tablet, Take 10 mg by mouth 2 (two) times daily., Disp: , Rfl:     No current facility-administered medications on file prior to visit.        Review of Systems   Constitutional:  Negative for activity change, chills, fatigue and fever.   HENT:  Negative for congestion, ear pain, rhinorrhea and sore throat.    Eyes:  Negative for visual disturbance.   Respiratory:  Positive for cough. Negative for chest tightness and shortness of breath.    Cardiovascular:  Negative for chest pain and palpitations.   Gastrointestinal:  Negative for  abdominal pain, blood in stool, constipation, diarrhea, nausea and vomiting.   Genitourinary:  Negative for difficulty urinating, dysuria and hematuria.   Musculoskeletal:  Negative for arthralgias and neck pain.   Skin:  Negative for rash.   Neurological:  Negative for dizziness and headaches.   Psychiatric/Behavioral:  Negative for sleep disturbance.      Objective:      Physical Exam  Vitals and nursing note reviewed.   Constitutional:       General: He is not in acute distress.     Appearance: Normal appearance. He is well-developed. He is obese. He is not ill-appearing, toxic-appearing or diaphoretic.      Comments: Good blood pressure control   Severe obesity with a BMI of 35.7 he is down 6.5 lb from his October 22, 2021 visit   HENT:      Head: Normocephalic and atraumatic.      Right Ear: Tympanic membrane, ear canal and external ear normal. There is no impacted cerumen.      Left Ear: Tympanic membrane, ear canal and external ear normal. There is no impacted cerumen.      Nose: Nose normal. No congestion or rhinorrhea.      Mouth/Throat:      Mouth: Mucous membranes are moist.      Pharynx: Oropharynx is clear. No oropharyngeal exudate or posterior oropharyngeal erythema.   Eyes:      General: No scleral icterus.     Conjunctiva/sclera: Conjunctivae normal.      Pupils: Pupils are equal, round, and reactive to light.   Neck:      Thyroid: No thyromegaly.      Vascular: No carotid bruit or JVD.      Trachea: No tracheal deviation.   Cardiovascular:      Rate and Rhythm: Normal rate and regular rhythm.      Pulses: Normal pulses.      Heart sounds: Normal heart sounds. No murmur heard.    No friction rub. No gallop.   Pulmonary:      Effort: Pulmonary effort is normal. No respiratory distress.      Breath sounds: Normal breath sounds. No stridor. No wheezing, rhonchi or rales.   Chest:      Chest wall: No tenderness.   Abdominal:      General: Bowel sounds are normal. There is no distension.      Palpations:  Abdomen is soft. There is no mass.      Tenderness: There is no abdominal tenderness. There is no guarding or rebound.      Hernia: No hernia is present.   Musculoskeletal:         General: No swelling, tenderness, deformity or signs of injury. Normal range of motion.      Cervical back: Normal range of motion and neck supple. No rigidity or tenderness.      Right lower leg: No edema.      Left lower leg: No edema.   Lymphadenopathy:      Cervical: No cervical adenopathy.   Skin:     General: Skin is warm and dry.      Coloration: Skin is not jaundiced or pale.      Findings: No bruising, erythema, lesion or rash.   Neurological:      General: No focal deficit present.      Mental Status: He is alert and oriented to person, place, and time. Mental status is at baseline.      Cranial Nerves: No cranial nerve deficit.      Sensory: No sensory deficit.      Motor: No weakness.      Coordination: Coordination normal.      Gait: Gait normal.      Deep Tendon Reflexes: Reflexes are normal and symmetric. Reflexes normal.   Psychiatric:         Mood and Affect: Mood normal.         Behavior: Behavior normal.         Thought Content: Thought content normal.         Judgment: Judgment normal.       Assessment:       1. Encounter for preventive health examination    2. Upper respiratory tract infection, unspecified type    3. Cough, unspecified type    4. History of colon polyps    5. Colon cancer screening    6. Primary hypertension    7. Mixed hyperlipidemia    8. Left ventricular hypertrophy    9. Primary adenocarcinoma of prostate    10. Gastroesophageal reflux disease without esophagitis    11. Obesity (BMI 35.0-39.9 without comorbidity)          Plan:       1. Encounter for preventive health examination    2. Upper respiratory tract infection, unspecified type  Resolving viral URI, patient given benzonatate for cough    3. Cough, unspecified type  See above  - benzonatate (TESSALON) 200 MG capsule; Take 1 capsule (200 mg  total) by mouth 3 (three) times daily as needed for Cough.  Dispense: 60 capsule; Refill: 2    4. History of colon polyps  Case request for colonoscopy sent to GI  - Case Request Endoscopy: COLONOSCOPY    5. Colon cancer screening  See above  - Case Request Endoscopy: COLONOSCOPY    6. Primary hypertension  Well controlled with no change needed in losartan and hydrochlorothiazide    7. Mixed hyperlipidemia  Lab Results   Component Value Date    CHOL 191 07/11/2022    CHOL 197 04/10/2021    CHOL 157 05/09/2020     Lab Results   Component Value Date    HDL 57 07/11/2022    HDL 60 04/10/2021    HDL 68 05/09/2020     Lab Results   Component Value Date    LDLCALC 120.8 07/11/2022    LDLCALC 117.6 04/10/2021    LDLCALC 78.2 05/09/2020     Lab Results   Component Value Date    TRIG 66 07/11/2022    TRIG 97 04/10/2021    TRIG 54 05/09/2020     Lab Results   Component Value Date    CHOLHDL 29.8 07/11/2022    CHOLHDL 30.5 04/10/2021    CHOLHDL 43.3 05/09/2020     LDL not quite to goal but Lipitor 80mg is at maximum dose     8. Left ventricular hypertrophy  Stable    9. Primary adenocarcinoma of prostate  Stable, followed by Urology with annual PSA    10. Gastroesophageal reflux disease without esophagitis  Asymptomatic    11. Obesity (BMI 35.0-39.9 without comorbidity)  Improving

## 2022-11-22 ENCOUNTER — PATIENT MESSAGE (OUTPATIENT)
Dept: GASTROENTEROLOGY | Facility: CLINIC | Age: 62
End: 2022-11-22
Payer: COMMERCIAL

## 2022-11-22 ENCOUNTER — TELEPHONE (OUTPATIENT)
Dept: FAMILY MEDICINE | Facility: CLINIC | Age: 62
End: 2022-11-22
Payer: COMMERCIAL

## 2022-11-22 NOTE — TELEPHONE ENCOUNTER
Called number someone answered could hear noise in background and no one would answer. Portal message sent

## 2022-11-22 NOTE — TELEPHONE ENCOUNTER
----- Message from Otoniel Mason sent at 11/22/2022  3:13 PM CST -----  Who Called:pt      What is the reqeust in detail: is requesting a call back to schedule colonoscopy appt . Please advise       Can the clinic reply by [facility]? No       Best Call Back Number:[phone]      Additional Information:

## 2022-12-05 NOTE — TELEPHONE ENCOUNTER
If he needs some 80 mg Lipitor sent to the pharmacy let me know, he can use up his 40s in the meanwhile  
Left message for patient to call when he needs new rx.   
Patient med list changed to lipitor 80 daily.   
[Hypothyroidism] : hypothyroidism

## 2022-12-28 ENCOUNTER — TELEPHONE (OUTPATIENT)
Dept: FAMILY MEDICINE | Facility: CLINIC | Age: 62
End: 2022-12-28
Payer: COMMERCIAL

## 2022-12-28 DIAGNOSIS — G47.33 OSA (OBSTRUCTIVE SLEEP APNEA): Primary | ICD-10-CM

## 2022-12-28 NOTE — TELEPHONE ENCOUNTER
Patient requesting order for sleep study. Reports he works off [email or web address] and his co-workers are complaining about his snoring. Patient states he has had a sleep study in the past, and was told the test revealed he did not have enough episodes of apnea. Patient states condition has worsened, and he would like to have another sleep study performed. Please advise. Thank you.

## 2022-12-28 NOTE — TELEPHONE ENCOUNTER
----- Message from Julianna Neumann, Patient Care Assistant sent at 12/28/2022  7:31 AM CST -----  Regarding: orders  Contact: pt  Type: Needs Medical Advice  Who Called:  pt   Best Call Back Number: [phone] (home) [phone] (work)    Additional Information: pt states he would like a callback regarding orders for an sleep study. Please call pt to advise. Thanks!

## 2022-12-29 ENCOUNTER — TELEPHONE (OUTPATIENT)
Dept: FAMILY MEDICINE | Facility: CLINIC | Age: 62
End: 2022-12-29
Payer: COMMERCIAL

## 2022-12-29 NOTE — TELEPHONE ENCOUNTER
I called the patient in regards to his message and Dr. Rasmussen response, no answer left voicemail to return our call. Will try again at a later time

## 2022-12-29 NOTE — TELEPHONE ENCOUNTER
Is it just snoring or is he having other symptoms?  Daytime drowsiness, falls asleep easily during the day, fatigue, waking up at night choking or gasping?  Has anyone actually witnessed him stop breathing?  If he is not having other symptoms of sleep apnea and has already had a negative test I might suggest an ENT evaluation for the snoring.

## 2022-12-29 NOTE — TELEPHONE ENCOUNTER
I called the patient in regards to the questions you had and he responded as follows:   he has woke up choking,  and people have told him that they have noticed he stops breathing in sleep. Patient states that he has had a negative apnea years ago. I have scheduled the patient an appointment to come in and see you regarding this referral. 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 Any recommendations on what he can use to help. Please advise

## 2022-12-30 ENCOUNTER — PATIENT MESSAGE (OUTPATIENT)
Dept: FAMILY MEDICINE | Facility: CLINIC | Age: 62
End: 2022-12-30
Payer: COMMERCIAL

## 2022-12-30 NOTE — TELEPHONE ENCOUNTER
Order is in for a sleep study but his insurance likely will require a home study.  He will also need a TSH done before the sleep study can be done.  The order is in for both

## 2023-01-27 ENCOUNTER — TELEPHONE (OUTPATIENT)
Dept: GASTROENTEROLOGY | Facility: CLINIC | Age: 63
End: 2023-01-27
Payer: COMMERCIAL

## 2023-01-27 NOTE — TELEPHONE ENCOUNTER
----- Message from Lori Gomez sent at 1/27/2023  9:46 AM CST -----  Regarding: reschedule appointment  Name of Who is Calling:MAN AYALA [[id number]]          What is the request in detail: pt would like to reschedule appointment              Can the clinic reply by [facility]: no            [facility]: [phone] (home) [phone] (work)

## 2023-02-06 DIAGNOSIS — F41.9 ANXIETY AND DEPRESSION: ICD-10-CM

## 2023-02-06 DIAGNOSIS — F32.A ANXIETY AND DEPRESSION: ICD-10-CM

## 2023-02-06 DIAGNOSIS — I10 ESSENTIAL HYPERTENSION: ICD-10-CM

## 2023-02-06 DIAGNOSIS — E78.5 HYPERLIPIDEMIA, UNSPECIFIED HYPERLIPIDEMIA TYPE: ICD-10-CM

## 2023-02-06 RX ORDER — ATORVASTATIN CALCIUM 80 MG/1
80 TABLET, FILM COATED ORAL DAILY
Qty: 90 TABLET | Refills: 1 | Status: SHIPPED | OUTPATIENT
Start: 2023-02-06

## 2023-02-06 RX ORDER — ESCITALOPRAM OXALATE 10 MG/1
10 TABLET ORAL DAILY
Qty: 90 TABLET | Refills: 2 | Status: SHIPPED | OUTPATIENT
Start: 2023-02-06

## 2023-02-06 RX ORDER — LOSARTAN POTASSIUM 100 MG/1
100 TABLET ORAL DAILY
Qty: 90 TABLET | Refills: 1 | Status: SHIPPED | OUTPATIENT
Start: 2023-02-06

## 2023-02-06 NOTE — TELEPHONE ENCOUNTER
No new care gaps identified.  [facility] Embedded Care Gaps. Reference number: [id number]. 2/06/2023   12:04:47 AM CST

## 2023-02-06 NOTE — TELEPHONE ENCOUNTER
Refill Decision Note   Rodo Luna  is requesting a refill authorization.  Brief Assessment and Rationale for Refill:  Approve     Medication Therapy Plan:       Medication Reconciliation Completed: No   Comments:     No Care Gaps recommended.     Note composed:11:53 AM 02/06/2023

## 2023-11-22 ENCOUNTER — TELEPHONE (OUTPATIENT)
Dept: FAMILY MEDICINE | Facility: CLINIC | Age: 63
End: 2023-11-22
Payer: COMMERCIAL

## 2023-11-22 NOTE — TELEPHONE ENCOUNTER
..I called the patient to confirm his appointment that he has with Dr. Rasmussen on 11/24/2023 at 8:20am, no answer left voicemail to return our call. I will send the patient a portal message as well.

## 2024-06-03 ENCOUNTER — PATIENT MESSAGE (OUTPATIENT)
Dept: ADMINISTRATIVE | Facility: HOSPITAL | Age: 64
End: 2024-06-03
Payer: COMMERCIAL

## 2024-06-03 ENCOUNTER — PATIENT OUTREACH (OUTPATIENT)
Dept: ADMINISTRATIVE | Facility: HOSPITAL | Age: 64
End: 2024-06-03
Payer: COMMERCIAL

## 2024-06-03 NOTE — PROGRESS NOTES
Population Health Chart Review & Patient Outreach Details      Additional [facility] Notes:      Uncontrolled BP REPORT  BP Readings from Last 3 Encounters:   11/18/22 130/78   09/01/22 (!) 165/95   07/11/22 138/86       Non-compliant report chart audits for HYPERTENSION MANAGEMENT     Outreach to patient in reference to hypertension management       BLOOD PRESSURE FOLLOW UP NEEDED WITH A CARE TEAM MEMBER    ACTIVE PORTAL MESSAGE OR LETTER SENT                       Updates Requested / Reviewed:        Health Maintenance Topics Overdue:        VBHM Score: 3     Colon Cancer Screening  Uncontrolled BP  Hemoglobin A1c    RSV Vaccine                      Health Maintenance Topic(s) Outreach Outcomes & Actions Taken:    Blood Pressure - Outreach Outcomes & Actions Taken  : msg    Primary Care Appt - Outreach Outcomes & Actions Taken  : msg

## 2024-07-09 ENCOUNTER — PATIENT MESSAGE (OUTPATIENT)
Dept: ADMINISTRATIVE | Facility: HOSPITAL | Age: 64
End: 2024-07-09
Payer: COMMERCIAL

## 2025-03-11 NOTE — TELEPHONE ENCOUNTER
----- Message from Lamar Rasmussen sent at 4/12/2017 12:13 PM CDT -----  Patient is trying to set up Sleep Study, has left a message with [facility] but hasn't received a response.     Please call back at [phone]    Thank you  
Patient has not heard back from [facility] sleep lab- I did contact them by fax to call patient back- will order study at [facility]-patient aware they will call him.  
Clear